# Patient Record
Sex: MALE | Race: WHITE | NOT HISPANIC OR LATINO
[De-identification: names, ages, dates, MRNs, and addresses within clinical notes are randomized per-mention and may not be internally consistent; named-entity substitution may affect disease eponyms.]

---

## 2017-04-06 ENCOUNTER — APPOINTMENT (OUTPATIENT)
Dept: DERMATOLOGY | Facility: CLINIC | Age: 62
End: 2017-04-06

## 2017-04-06 VITALS
DIASTOLIC BLOOD PRESSURE: 70 MMHG | WEIGHT: 170 LBS | SYSTOLIC BLOOD PRESSURE: 128 MMHG | BODY MASS INDEX: 29.02 KG/M2 | HEIGHT: 64 IN

## 2017-04-06 DIAGNOSIS — Z56.0 UNEMPLOYMENT, UNSPECIFIED: ICD-10-CM

## 2017-04-06 DIAGNOSIS — L29.9 PRURITUS, UNSPECIFIED: ICD-10-CM

## 2017-04-06 DIAGNOSIS — I25.2 OLD MYOCARDIAL INFARCTION: ICD-10-CM

## 2017-04-06 SDOH — ECONOMIC STABILITY - INCOME SECURITY: UNEMPLOYMENT, UNSPECIFIED: Z56.0

## 2017-11-01 ENCOUNTER — APPOINTMENT (OUTPATIENT)
Dept: CT IMAGING | Facility: IMAGING CENTER | Age: 62
End: 2017-11-01
Payer: MEDICARE

## 2017-11-01 ENCOUNTER — OUTPATIENT (OUTPATIENT)
Dept: OUTPATIENT SERVICES | Facility: HOSPITAL | Age: 62
LOS: 1 days | End: 2017-11-01
Payer: MEDICARE

## 2017-11-01 DIAGNOSIS — R91.1 SOLITARY PULMONARY NODULE: ICD-10-CM

## 2017-11-01 PROCEDURE — 71250 CT THORAX DX C-: CPT

## 2017-11-10 ENCOUNTER — OUTPATIENT (OUTPATIENT)
Dept: OUTPATIENT SERVICES | Facility: HOSPITAL | Age: 62
LOS: 1 days | Discharge: HOME | End: 2017-11-10
Payer: MEDICARE

## 2017-11-10 DIAGNOSIS — R06.09 OTHER FORMS OF DYSPNEA: ICD-10-CM

## 2017-11-10 LAB
BUN SERPL-MCNC: 24 MG/DL — HIGH (ref 7–23)
CALCIUM SERPL-MCNC: 9 MG/DL — SIGNIFICANT CHANGE UP (ref 8.4–10.5)
CHLORIDE SERPL-SCNC: 106 MMOL/L — SIGNIFICANT CHANGE UP (ref 98–107)
CO2 SERPL-SCNC: 22 MMOL/L — SIGNIFICANT CHANGE UP (ref 22–31)
CREAT SERPL-MCNC: 0.88 MG/DL — SIGNIFICANT CHANGE UP (ref 0.5–1.3)
GLUCOSE SERPL-MCNC: 139 MG/DL — HIGH (ref 70–99)
HBA1C BLD-MCNC: 5.9 % — HIGH (ref 4–5.6)
HCT VFR BLD CALC: 45.3 % — SIGNIFICANT CHANGE UP (ref 39–50)
HGB BLD-MCNC: 15.1 G/DL — SIGNIFICANT CHANGE UP (ref 13–17)
MCHC RBC-ENTMCNC: 29 PG — SIGNIFICANT CHANGE UP (ref 27–34)
MCHC RBC-ENTMCNC: 33.3 % — SIGNIFICANT CHANGE UP (ref 32–36)
MCV RBC AUTO: 87.1 FL — SIGNIFICANT CHANGE UP (ref 80–100)
NRBC # FLD: 0 — SIGNIFICANT CHANGE UP
PLATELET # BLD AUTO: 188 K/UL — SIGNIFICANT CHANGE UP (ref 150–400)
PMV BLD: 11.1 FL — SIGNIFICANT CHANGE UP (ref 7–13)
POTASSIUM SERPL-MCNC: 4.3 MMOL/L — SIGNIFICANT CHANGE UP (ref 3.5–5.3)
POTASSIUM SERPL-SCNC: 4.3 MMOL/L — SIGNIFICANT CHANGE UP (ref 3.5–5.3)
RBC # BLD: 5.2 M/UL — SIGNIFICANT CHANGE UP (ref 4.2–5.8)
RBC # FLD: 13 % — SIGNIFICANT CHANGE UP (ref 10.3–14.5)
SODIUM SERPL-SCNC: 143 MMOL/L — SIGNIFICANT CHANGE UP (ref 135–145)
WBC # BLD: 4.72 K/UL — SIGNIFICANT CHANGE UP (ref 3.8–10.5)
WBC # FLD AUTO: 4.72 K/UL — SIGNIFICANT CHANGE UP (ref 3.8–10.5)

## 2017-11-10 RX ORDER — SODIUM CHLORIDE 9 MG/ML
1000 INJECTION, SOLUTION INTRAVENOUS
Refills: 0 | Status: DISCONTINUED | OUTPATIENT
Start: 2017-11-10 | End: 2017-11-25

## 2017-11-10 RX ORDER — INSULIN LISPRO 100/ML
VIAL (ML) SUBCUTANEOUS
Refills: 0 | Status: DISCONTINUED | OUTPATIENT
Start: 2017-11-10 | End: 2017-11-25

## 2017-11-10 RX ORDER — DEXTROSE 50 % IN WATER 50 %
25 SYRINGE (ML) INTRAVENOUS ONCE
Refills: 0 | Status: DISCONTINUED | OUTPATIENT
Start: 2017-11-10 | End: 2017-11-25

## 2017-11-10 RX ORDER — SODIUM CHLORIDE 9 MG/ML
3 INJECTION INTRAMUSCULAR; INTRAVENOUS; SUBCUTANEOUS EVERY 8 HOURS
Refills: 0 | Status: DISCONTINUED | OUTPATIENT
Start: 2017-11-10 | End: 2017-11-25

## 2017-11-10 RX ORDER — DEXTROSE 50 % IN WATER 50 %
1 SYRINGE (ML) INTRAVENOUS ONCE
Refills: 0 | Status: DISCONTINUED | OUTPATIENT
Start: 2017-11-10 | End: 2017-11-25

## 2017-11-10 RX ORDER — GLUCAGON INJECTION, SOLUTION 0.5 MG/.1ML
1 INJECTION, SOLUTION SUBCUTANEOUS ONCE
Refills: 0 | Status: DISCONTINUED | OUTPATIENT
Start: 2017-11-10 | End: 2017-11-25

## 2017-11-10 RX ORDER — DEXTROSE 50 % IN WATER 50 %
12.5 SYRINGE (ML) INTRAVENOUS ONCE
Refills: 0 | Status: DISCONTINUED | OUTPATIENT
Start: 2017-11-10 | End: 2017-11-25

## 2017-11-10 RX ORDER — SODIUM CHLORIDE 9 MG/ML
500 INJECTION INTRAMUSCULAR; INTRAVENOUS; SUBCUTANEOUS
Refills: 0 | Status: DISCONTINUED | OUTPATIENT
Start: 2017-11-10 | End: 2017-11-25

## 2017-11-10 NOTE — H&P CARDIOLOGY - NEGATIVE CARDIOVASCULAR SYMPTOMS
no chest pain/no palpitations/no orthopnea/no paroxysmal nocturnal dyspnea/no peripheral edema/no claudication no palpitations/no orthopnea/no paroxysmal nocturnal dyspnea/no peripheral edema/no claudication

## 2017-11-10 NOTE — H&P CARDIOLOGY - HISTORY OF PRESENT ILLNESS
63 y/o M w/ PMH of HTN, HLD and DM presents for cardiac catheretization. Pt states that he has been experiencing intermittent chest pain and dyspnea on exertion with a recent workup of TTE and stress test. Pt stress test showed a fixed proximal infero-lateral wall perfusion defect c/w an MI with no evidence of stress induced ischemia. Pt's echo showed mild diastolic dysfunction, EF 63% and mild valvular disease. Pt denies N/V/D, fevers, chills, cough, palpitations, substernal distress, syncope, orthopnea, nocturnal paroxysmal dyspnea, edema, cyanosis, heart murmurs, varicosities, phlebitis, claudication.

## 2017-11-10 NOTE — H&P CARDIOLOGY - RS GEN PE MLT RESP DETAILS PC
airway patent/breath sounds equal/good air movement/respirations non-labored/clear to auscultation bilaterally/no chest wall tenderness

## 2017-11-10 NOTE — H&P CARDIOLOGY - NEGATIVE NEUROLOGICAL SYMPTOMS
no transient paralysis/no weakness/no paresthesias/no generalized seizures/no focal seizures/no syncope/no tremors/no vertigo/no loss of sensation/no difficulty walking/no headache/no loss of consciousness/no hemiparesis/no confusion/no facial palsy

## 2017-12-06 ENCOUNTER — APPOINTMENT (OUTPATIENT)
Dept: PULMONOLOGY | Facility: CLINIC | Age: 62
End: 2017-12-06
Payer: MEDICARE

## 2017-12-06 VITALS
HEIGHT: 64 IN | OXYGEN SATURATION: 98 % | DIASTOLIC BLOOD PRESSURE: 80 MMHG | BODY MASS INDEX: 29.02 KG/M2 | TEMPERATURE: 97.8 F | RESPIRATION RATE: 16 BRPM | HEART RATE: 56 BPM | SYSTOLIC BLOOD PRESSURE: 120 MMHG | WEIGHT: 170 LBS

## 2018-03-29 ENCOUNTER — APPOINTMENT (OUTPATIENT)
Dept: GASTROENTEROLOGY | Facility: CLINIC | Age: 63
End: 2018-03-29
Payer: MEDICARE

## 2018-03-29 VITALS
SYSTOLIC BLOOD PRESSURE: 143 MMHG | WEIGHT: 169 LBS | DIASTOLIC BLOOD PRESSURE: 93 MMHG | BODY MASS INDEX: 28.85 KG/M2 | HEART RATE: 60 BPM | HEIGHT: 64 IN

## 2018-03-29 DIAGNOSIS — E78.00 PURE HYPERCHOLESTEROLEMIA, UNSPECIFIED: ICD-10-CM

## 2018-03-29 DIAGNOSIS — E11.9 TYPE 2 DIABETES MELLITUS W/OUT COMPLICATIONS: ICD-10-CM

## 2018-03-29 DIAGNOSIS — Z80.0 FAMILY HISTORY OF MALIGNANT NEOPLASM OF DIGESTIVE ORGANS: ICD-10-CM

## 2018-03-29 DIAGNOSIS — E66.3 OVERWEIGHT: ICD-10-CM

## 2018-03-29 DIAGNOSIS — Z86.010 PERSONAL HISTORY OF COLONIC POLYPS: ICD-10-CM

## 2018-03-29 DIAGNOSIS — I10 ESSENTIAL (PRIMARY) HYPERTENSION: ICD-10-CM

## 2018-03-29 RX ORDER — LISINOPRIL 10 MG/1
10 TABLET ORAL
Refills: 0 | Status: ACTIVE | COMMUNITY

## 2018-03-29 RX ORDER — CAMPHOR AND MENTHOL 5; 5 MG/ML; MG/ML
0.5-0.5 LOTION TOPICAL
Qty: 1 | Refills: 5 | Status: DISCONTINUED | COMMUNITY
Start: 2017-04-06 | End: 2018-03-29

## 2018-03-29 RX ORDER — ATORVASTATIN CALCIUM 10 MG/1
10 TABLET, FILM COATED ORAL
Refills: 0 | Status: ACTIVE | COMMUNITY

## 2018-03-29 RX ORDER — OMEPRAZOLE 20 MG/1
20 CAPSULE, DELAYED RELEASE ORAL
Qty: 90 | Refills: 0 | Status: ACTIVE | COMMUNITY
Start: 2018-02-21

## 2018-03-29 RX ORDER — METFORMIN ER 500 MG 500 MG/1
500 TABLET ORAL
Refills: 0 | Status: ACTIVE | COMMUNITY

## 2018-09-21 ENCOUNTER — APPOINTMENT (OUTPATIENT)
Dept: GASTROENTEROLOGY | Facility: CLINIC | Age: 63
End: 2018-09-21
Payer: MEDICARE

## 2018-09-21 VITALS
SYSTOLIC BLOOD PRESSURE: 157 MMHG | HEIGHT: 64 IN | DIASTOLIC BLOOD PRESSURE: 93 MMHG | BODY MASS INDEX: 28.34 KG/M2 | HEART RATE: 58 BPM | WEIGHT: 166 LBS

## 2018-09-21 DIAGNOSIS — K62.5 HEMORRHAGE OF ANUS AND RECTUM: ICD-10-CM

## 2018-09-21 DIAGNOSIS — R14.0 ABDOMINAL DISTENSION (GASEOUS): ICD-10-CM

## 2018-09-21 DIAGNOSIS — K21.9 GASTRO-ESOPHAGEAL REFLUX DISEASE W/OUT ESOPHAGITIS: ICD-10-CM

## 2018-09-21 RX ORDER — HYDROCORTISONE 25 MG/G
2.5 CREAM TOPICAL
Qty: 28 | Refills: 0 | Status: ACTIVE | COMMUNITY
Start: 2018-09-19

## 2020-07-23 ENCOUNTER — TRANSCRIPTION ENCOUNTER (OUTPATIENT)
Age: 65
End: 2020-07-23

## 2021-12-20 ENCOUNTER — APPOINTMENT (OUTPATIENT)
Dept: SURGERY | Facility: CLINIC | Age: 66
End: 2021-12-20
Payer: MEDICARE

## 2021-12-20 VITALS
BODY MASS INDEX: 29.02 KG/M2 | SYSTOLIC BLOOD PRESSURE: 165 MMHG | HEART RATE: 65 BPM | WEIGHT: 170 LBS | HEIGHT: 64 IN | TEMPERATURE: 98.4 F | OXYGEN SATURATION: 97 % | DIASTOLIC BLOOD PRESSURE: 95 MMHG

## 2021-12-20 DIAGNOSIS — R10.9 UNSPECIFIED ABDOMINAL PAIN: ICD-10-CM

## 2021-12-20 NOTE — PHYSICAL EXAM
[de-identified] : Well appearing, no acute distress [Calm] : calm [de-identified] : Non labored [de-identified] : mildly distended, soft, non tender. No inguinal hernia appreciated on exam with Valsalva, no skin changes.

## 2021-12-20 NOTE — ASSESSMENT
[FreeTextEntry1] : 65 yo male with history as above, here for evaluation of possible inguinal hernia. No hernia on exam, and history is not consistent with hernia pain/discomfort; more likely radicular pain vs muscle strain. Patient also has ongoing migrating abdominal pain associated with gas which complicates history. Will acquire an ultrasound with Valsalva to evaluate for occult hernia. Will follow up with patient with result.

## 2021-12-20 NOTE — HISTORY OF PRESENT ILLNESS
[de-identified] : 67 yo male with history of herniated disc, DM, MI, and gas pain presenting with a few weeks of left back/low flank pain radiating to left groin. Pain is mild to moderate, occurs about every other day, and does not seem to be associated with movement or eating. Denies obstructive symptoms. Denies having seen or felt a bulge, denies skin changes. Has never had this before.

## 2021-12-31 ENCOUNTER — OUTPATIENT (OUTPATIENT)
Dept: OUTPATIENT SERVICES | Facility: HOSPITAL | Age: 66
LOS: 1 days | End: 2021-12-31
Payer: MEDICARE

## 2021-12-31 ENCOUNTER — APPOINTMENT (OUTPATIENT)
Dept: ULTRASOUND IMAGING | Facility: IMAGING CENTER | Age: 66
End: 2021-12-31
Payer: MEDICARE

## 2021-12-31 DIAGNOSIS — Z00.8 ENCOUNTER FOR OTHER GENERAL EXAMINATION: ICD-10-CM

## 2021-12-31 DIAGNOSIS — R10.9 UNSPECIFIED ABDOMINAL PAIN: ICD-10-CM

## 2021-12-31 PROCEDURE — 76857 US EXAM PELVIC LIMITED: CPT

## 2022-02-17 ENCOUNTER — INPATIENT (INPATIENT)
Facility: HOSPITAL | Age: 67
LOS: 3 days | Discharge: HOME | DRG: 287 | End: 2022-02-21
Attending: INTERNAL MEDICINE | Admitting: INTERNAL MEDICINE
Payer: MEDICARE

## 2022-02-17 VITALS
TEMPERATURE: 99 F | HEART RATE: 86 BPM | SYSTOLIC BLOOD PRESSURE: 126 MMHG | OXYGEN SATURATION: 98 % | HEIGHT: 66 IN | WEIGHT: 175.05 LBS | RESPIRATION RATE: 20 BRPM | DIASTOLIC BLOOD PRESSURE: 56 MMHG

## 2022-02-17 LAB
ALBUMIN SERPL ELPH-MCNC: 4.5 G/DL — SIGNIFICANT CHANGE UP (ref 3.3–5)
ALP SERPL-CCNC: 53 U/L — SIGNIFICANT CHANGE UP (ref 40–120)
ALT FLD-CCNC: 22 U/L — SIGNIFICANT CHANGE UP (ref 10–45)
ANION GAP SERPL CALC-SCNC: 14 MMOL/L — SIGNIFICANT CHANGE UP (ref 5–17)
APTT BLD: 31.1 SEC — SIGNIFICANT CHANGE UP (ref 27.5–35.5)
AST SERPL-CCNC: 16 U/L — SIGNIFICANT CHANGE UP (ref 10–40)
BASOPHILS # BLD AUTO: 0.02 K/UL — SIGNIFICANT CHANGE UP (ref 0–0.2)
BASOPHILS NFR BLD AUTO: 0.3 % — SIGNIFICANT CHANGE UP (ref 0–2)
BILIRUB SERPL-MCNC: 0.4 MG/DL — SIGNIFICANT CHANGE UP (ref 0.2–1.2)
BUN SERPL-MCNC: 16 MG/DL — SIGNIFICANT CHANGE UP (ref 7–23)
CALCIUM SERPL-MCNC: 9.4 MG/DL — SIGNIFICANT CHANGE UP (ref 8.4–10.5)
CHLORIDE SERPL-SCNC: 103 MMOL/L — SIGNIFICANT CHANGE UP (ref 96–108)
CO2 SERPL-SCNC: 21 MMOL/L — LOW (ref 22–31)
CREAT SERPL-MCNC: 1.02 MG/DL — SIGNIFICANT CHANGE UP (ref 0.5–1.3)
EOSINOPHIL # BLD AUTO: 0.06 K/UL — SIGNIFICANT CHANGE UP (ref 0–0.5)
EOSINOPHIL NFR BLD AUTO: 0.8 % — SIGNIFICANT CHANGE UP (ref 0–6)
GLUCOSE SERPL-MCNC: 141 MG/DL — HIGH (ref 70–99)
HCT VFR BLD CALC: 41.8 % — SIGNIFICANT CHANGE UP (ref 39–50)
HGB BLD-MCNC: 14.2 G/DL — SIGNIFICANT CHANGE UP (ref 13–17)
IMM GRANULOCYTES NFR BLD AUTO: 0.4 % — SIGNIFICANT CHANGE UP (ref 0–1.5)
INR BLD: 0.98 RATIO — SIGNIFICANT CHANGE UP (ref 0.88–1.16)
LYMPHOCYTES # BLD AUTO: 1.72 K/UL — SIGNIFICANT CHANGE UP (ref 1–3.3)
LYMPHOCYTES # BLD AUTO: 22.2 % — SIGNIFICANT CHANGE UP (ref 13–44)
MCHC RBC-ENTMCNC: 29.7 PG — SIGNIFICANT CHANGE UP (ref 27–34)
MCHC RBC-ENTMCNC: 34 GM/DL — SIGNIFICANT CHANGE UP (ref 32–36)
MCV RBC AUTO: 87.4 FL — SIGNIFICANT CHANGE UP (ref 80–100)
MONOCYTES # BLD AUTO: 0.54 K/UL — SIGNIFICANT CHANGE UP (ref 0–0.9)
MONOCYTES NFR BLD AUTO: 7 % — SIGNIFICANT CHANGE UP (ref 2–14)
NEUTROPHILS # BLD AUTO: 5.38 K/UL — SIGNIFICANT CHANGE UP (ref 1.8–7.4)
NEUTROPHILS NFR BLD AUTO: 69.3 % — SIGNIFICANT CHANGE UP (ref 43–77)
NRBC # BLD: 0 /100 WBCS — SIGNIFICANT CHANGE UP (ref 0–0)
PLATELET # BLD AUTO: 183 K/UL — SIGNIFICANT CHANGE UP (ref 150–400)
POTASSIUM SERPL-MCNC: 3.7 MMOL/L — SIGNIFICANT CHANGE UP (ref 3.5–5.3)
POTASSIUM SERPL-SCNC: 3.7 MMOL/L — SIGNIFICANT CHANGE UP (ref 3.5–5.3)
PROT SERPL-MCNC: 6.6 G/DL — SIGNIFICANT CHANGE UP (ref 6–8.3)
PROTHROM AB SERPL-ACNC: 11.8 SEC — SIGNIFICANT CHANGE UP (ref 10.6–13.6)
RBC # BLD: 4.78 M/UL — SIGNIFICANT CHANGE UP (ref 4.2–5.8)
RBC # FLD: 12.7 % — SIGNIFICANT CHANGE UP (ref 10.3–14.5)
SODIUM SERPL-SCNC: 138 MMOL/L — SIGNIFICANT CHANGE UP (ref 135–145)
TROPONIN T, HIGH SENSITIVITY RESULT: 8 NG/L — SIGNIFICANT CHANGE UP (ref 0–51)
WBC # BLD: 7.75 K/UL — SIGNIFICANT CHANGE UP (ref 3.8–10.5)
WBC # FLD AUTO: 7.75 K/UL — SIGNIFICANT CHANGE UP (ref 3.8–10.5)

## 2022-02-17 NOTE — ED PROVIDER NOTE - OBJECTIVE STATEMENT
exertional CP today with pain across chest, some dull radiation to back. 66 year old M with PMH HTN, HLD, DM presenting with exertional CP x1 day. Has had intermittent CP for past two weeks, usually resolves. Today with pain across chest, some dull radiation to back, occurred while he was taking out the trash. Also developed some SOB with pain today. Pain is non positional, non pleuritic. No associated N/V or diaphoresis. Denies syncope or new LE edema. Seen at PCP and referred to ED. Received sublingual nitroglycerin and aspirin en route, with some relief of pain. Denies fevers, chills, diarrhea, abdominal pain, URI symptoms, urinary symptoms, dark or bloody BMs.

## 2022-02-17 NOTE — ED PROVIDER NOTE - ATTENDING CONTRIBUTION TO CARE
attending Shon: 66yM former smoker h/o HTN, HLD, DMII on metformin and insulin p/w exertional chest pain x several days. Pressure like, b/l chest with radiation to upper back. Reports having a normal stress test about a year ago. Denies SOB, nausea/vomiting, syncope, diaphoresis. Received SL nitro and ASA with EMS. Exam as above. Will obtain ekg, place on tele, labs including trop, cxr, reassess

## 2022-02-17 NOTE — ED PROVIDER NOTE - CLINICAL SUMMARY MEDICAL DECISION MAKING FREE TEXT BOX
Concern for CP with exertion. No EKG changes in ED or on prior EKGS from EMS and PCP. Low suspicion for PE or dissection. Will get ACS labs, contact cardiology. CDU for cardiac workup. 66 year old M with PMH HTN, HLD, DM presenting with exertional CP x1 day. VSS, afebrile, well appearing. High risk HEART score. No EKG changes in ED or on prior EKGS from EMS and PCP. Low suspicion for PE or dissection. Will get ACS labs, contact cardiology. CDU for cardiac workup.

## 2022-02-18 DIAGNOSIS — R07.9 CHEST PAIN, UNSPECIFIED: ICD-10-CM

## 2022-02-18 LAB
CHOLEST SERPL-MCNC: 143 MG/DL — SIGNIFICANT CHANGE UP
GLUCOSE BLDC GLUCOMTR-MCNC: 112 MG/DL — HIGH (ref 70–99)
HDLC SERPL-MCNC: 64 MG/DL — SIGNIFICANT CHANGE UP
LIPID PNL WITH DIRECT LDL SERPL: 66 MG/DL — SIGNIFICANT CHANGE UP
NON HDL CHOLESTEROL: 79 MG/DL — SIGNIFICANT CHANGE UP
SARS-COV-2 RNA SPEC QL NAA+PROBE: SIGNIFICANT CHANGE UP
TRIGL SERPL-MCNC: 66 MG/DL — SIGNIFICANT CHANGE UP
TROPONIN T, HIGH SENSITIVITY RESULT: 8 NG/L — SIGNIFICANT CHANGE UP (ref 0–51)

## 2022-02-18 RX ORDER — DEXTROSE 50 % IN WATER 50 %
15 SYRINGE (ML) INTRAVENOUS ONCE
Refills: 0 | Status: DISCONTINUED | OUTPATIENT
Start: 2022-02-18 | End: 2022-02-21

## 2022-02-18 RX ORDER — GABAPENTIN 400 MG/1
300 CAPSULE ORAL
Refills: 0 | Status: DISCONTINUED | OUTPATIENT
Start: 2022-02-18 | End: 2022-02-21

## 2022-02-18 RX ORDER — DEXTROSE 50 % IN WATER 50 %
12.5 SYRINGE (ML) INTRAVENOUS ONCE
Refills: 0 | Status: DISCONTINUED | OUTPATIENT
Start: 2022-02-18 | End: 2022-02-21

## 2022-02-18 RX ORDER — INSULIN LISPRO 100/ML
VIAL (ML) SUBCUTANEOUS
Refills: 0 | Status: DISCONTINUED | OUTPATIENT
Start: 2022-02-18 | End: 2022-02-21

## 2022-02-18 RX ORDER — HEPARIN SODIUM 5000 [USP'U]/ML
5000 INJECTION INTRAVENOUS; SUBCUTANEOUS EVERY 8 HOURS
Refills: 0 | Status: DISCONTINUED | OUTPATIENT
Start: 2022-02-18 | End: 2022-02-21

## 2022-02-18 RX ORDER — ASPIRIN/CALCIUM CARB/MAGNESIUM 324 MG
81 TABLET ORAL DAILY
Refills: 0 | Status: DISCONTINUED | OUTPATIENT
Start: 2022-02-18 | End: 2022-02-21

## 2022-02-18 RX ORDER — INSULIN LISPRO 100/ML
VIAL (ML) SUBCUTANEOUS AT BEDTIME
Refills: 0 | Status: DISCONTINUED | OUTPATIENT
Start: 2022-02-18 | End: 2022-02-21

## 2022-02-18 RX ORDER — AMLODIPINE BESYLATE 2.5 MG/1
10 TABLET ORAL DAILY
Refills: 0 | Status: DISCONTINUED | OUTPATIENT
Start: 2022-02-18 | End: 2022-02-21

## 2022-02-18 RX ORDER — GLUCAGON INJECTION, SOLUTION 0.5 MG/.1ML
1 INJECTION, SOLUTION SUBCUTANEOUS ONCE
Refills: 0 | Status: DISCONTINUED | OUTPATIENT
Start: 2022-02-18 | End: 2022-02-21

## 2022-02-18 RX ORDER — FAMOTIDINE 10 MG/ML
20 INJECTION INTRAVENOUS ONCE
Refills: 0 | Status: COMPLETED | OUTPATIENT
Start: 2022-02-18 | End: 2022-02-18

## 2022-02-18 RX ORDER — KETOROLAC TROMETHAMINE 30 MG/ML
15 SYRINGE (ML) INJECTION ONCE
Refills: 0 | Status: DISCONTINUED | OUTPATIENT
Start: 2022-02-18 | End: 2022-02-18

## 2022-02-18 RX ORDER — DEXTROSE 50 % IN WATER 50 %
25 SYRINGE (ML) INTRAVENOUS ONCE
Refills: 0 | Status: DISCONTINUED | OUTPATIENT
Start: 2022-02-18 | End: 2022-02-21

## 2022-02-18 RX ORDER — FAMOTIDINE 10 MG/ML
20 INJECTION INTRAVENOUS DAILY
Refills: 0 | Status: DISCONTINUED | OUTPATIENT
Start: 2022-02-18 | End: 2022-02-18

## 2022-02-18 RX ORDER — SODIUM CHLORIDE 9 MG/ML
1000 INJECTION, SOLUTION INTRAVENOUS
Refills: 0 | Status: DISCONTINUED | OUTPATIENT
Start: 2022-02-18 | End: 2022-02-21

## 2022-02-18 RX ORDER — LIDOCAINE 4 G/100G
1 CREAM TOPICAL ONCE
Refills: 0 | Status: COMPLETED | OUTPATIENT
Start: 2022-02-18 | End: 2022-02-18

## 2022-02-18 RX ORDER — ATORVASTATIN CALCIUM 80 MG/1
40 TABLET, FILM COATED ORAL AT BEDTIME
Refills: 0 | Status: DISCONTINUED | OUTPATIENT
Start: 2022-02-18 | End: 2022-02-21

## 2022-02-18 RX ADMIN — LIDOCAINE 1 PATCH: 4 CREAM TOPICAL at 06:55

## 2022-02-18 RX ADMIN — AMLODIPINE BESYLATE 10 MILLIGRAM(S): 2.5 TABLET ORAL at 06:51

## 2022-02-18 RX ADMIN — LIDOCAINE 1 PATCH: 4 CREAM TOPICAL at 08:00

## 2022-02-18 RX ADMIN — LIDOCAINE 1 PATCH: 4 CREAM TOPICAL at 17:27

## 2022-02-18 RX ADMIN — ATORVASTATIN CALCIUM 40 MILLIGRAM(S): 80 TABLET, FILM COATED ORAL at 22:15

## 2022-02-18 RX ADMIN — FAMOTIDINE 20 MILLIGRAM(S): 10 INJECTION INTRAVENOUS at 06:52

## 2022-02-18 RX ADMIN — Medication 15 MILLIGRAM(S): at 07:25

## 2022-02-18 RX ADMIN — GABAPENTIN 300 MILLIGRAM(S): 400 CAPSULE ORAL at 06:51

## 2022-02-18 RX ADMIN — GABAPENTIN 300 MILLIGRAM(S): 400 CAPSULE ORAL at 18:01

## 2022-02-18 RX ADMIN — Medication 15 MILLIGRAM(S): at 06:52

## 2022-02-18 RX ADMIN — Medication 81 MILLIGRAM(S): at 23:50

## 2022-02-18 RX ADMIN — HEPARIN SODIUM 5000 UNIT(S): 5000 INJECTION INTRAVENOUS; SUBCUTANEOUS at 22:16

## 2022-02-18 NOTE — CONSULT NOTE ADULT - SUBJECTIVE AND OBJECTIVE BOX
DATE OF SERVICE: 02-18-22 @ 21:53    CHIEF COMPLAINT:Patient is a 66y old  Male who presents with a chief complaint of     HISTORY OF PRESENT ILLNESS:HPI:  patient is a 66 year old Male with PMH HTN, HLD, DM presenting with exertional CP x1 day. Has had intermittent CP for past two weeks, usually resolves. Today with pain across chest, some dull radiation to back, occurred while he was taking out the trash. Also developed some SOB with pain today. Pain is non positional, non pleuritic. No associated N/V or diaphoresis. Denies syncope or new LE edema. Seen at PCP and referred to ED. Received sublingual nitroglycerin and aspirin en route, with some relief of pain. Denies fevers, chills, diarrhea, abdominal pain, URI symptoms, urinary symptoms, dark or bloody BMs. (18 Feb 2022 20:40)      PAST MEDICAL & SURGICAL HISTORY:  HTN (hypertension)    HLD (hyperlipidemia)    DM (diabetes mellitus)    No significant past surgical history            MEDICATIONS:  amLODIPine   Tablet 10 milliGRAM(s) Oral daily  aspirin  chewable 81 milliGRAM(s) Oral daily  heparin   Injectable 5000 Unit(s) SubCutaneous every 8 hours        gabapentin 300 milliGRAM(s) Oral two times a day      atorvastatin 40 milliGRAM(s) Oral at bedtime  dextrose 40% Gel 15 Gram(s) Oral once  dextrose 50% Injectable 25 Gram(s) IV Push once  dextrose 50% Injectable 12.5 Gram(s) IV Push once  dextrose 50% Injectable 25 Gram(s) IV Push once  glucagon  Injectable 1 milliGRAM(s) IntraMuscular once  insulin lispro (ADMELOG) corrective regimen sliding scale   SubCutaneous three times a day before meals  insulin lispro (ADMELOG) corrective regimen sliding scale   SubCutaneous at bedtime    dextrose 5%. 1000 milliLiter(s) IV Continuous <Continuous>  dextrose 5%. 1000 milliLiter(s) IV Continuous <Continuous>      FAMILY HISTORY:  No pertinent family history in first degree relatives        Non-contributory    SOCIAL HISTORY:    [ ] Tobacco  [ ] Drugs  [ ] Alcohol    Allergies    influenza virus vaccine, inactivated (Hives; Urticaria)    Intolerances    	    REVIEW OF SYSTEMS:  CONSTITUTIONAL: No fever  EYES: No eye pain, visual disturbances, or discharge  ENMT:  No difficulty hearing, tinnitus  NECK: No pain or stiffness  RESPIRATORY: No cough, wheezing,  CARDIOVASCULAR: No chest pain, palpitations, passing out, dizziness, or leg swelling  GASTROINTESTINAL:  No nausea, vomiting, diarrhea or constipation. No melena.  GENITOURINARY: No dysuria, hematuria  NEUROLOGICAL: No stroke like symptoms  SKIN: No burning or lesions   ENDOCRINE: No heat or cold intolerance  MUSCULOSKELETAL: No joint pain or swelling  PSYCHIATRIC: No  anxiety, mood swings  HEME/LYMPH: No bleeding gums  ALLERGY AND IMMUNOLOGIC: No hives or eczema	    All other ROS negative    PHYSICAL EXAM:  T(C): 36.7 (02-18-22 @ 19:34), Max: 37.2 (02-18-22 @ 02:45)  HR: 51 (02-18-22 @ 19:34) (47 - 77)  BP: 132/78 (02-18-22 @ 19:34) (125/71 - 161/76)  RR: 16 (02-18-22 @ 19:34) (16 - 18)  SpO2: 96% (02-18-22 @ 19:34) (96% - 100%)  Wt(kg): --  I&O's Summary      Appearance: Normal	  HEENT:   Normal oral mucosa, EOMI	  Cardiovascular:  S1 S2, No JVD,    Respiratory: Lungs clear to auscultation	  Psychiatry: Alert  Gastrointestinal:  Soft, Non-tender, + BS	  Skin: No rashes   Neurologic: Non-focal  Extremities:  No edema  Vascular: Peripheral pulses palpable    	    	  	  CARDIAC MARKERS:  Labs personally reviewed by me                                  14.2   7.75  )-----------( 183      ( 17 Feb 2022 21:46 )             41.8     02-17    138  |  103  |  16  ----------------------------<  141<H>  3.7   |  21<L>  |  1.02    Ca    9.4      17 Feb 2022 21:46    TPro  6.6  /  Alb  4.5  /  TBili  0.4  /  DBili  x   /  AST  16  /  ALT  22  /  AlkPhos  53  02-17          EKG: Personally reviewed by me -   Radiology: Personally reviewed by me -       Assessment /Plan:   Cath Sunday  Full recs to follow        Differential diagnosis and plan of care discussed with patient after the evaluation. Counseling on diet, nutritional counseling, weight management, exercise and medication compliance was done.   Advanced care planning/advanced directives discussed with patient/family. DNR status including forceful chest compressions to attempt to restart the heart, ventilator support/artificial breathing, electric shock, artificial nutrition, health care proxy, Molst form all discussed with pt. Pt wishes to consider. More than fifteen minutes spent on discussing advanced directives. OMT on six regions for acute somatic dysfunctions done at the bedside. One hundred ten minutes spent on encounter, of which more than fifty percent of the encounter was spent counseling and/or coordinating care by the attending physician        Unruly Santos DO [facility]  Cardiovascular Medicine  [address] [place] [address], [address]  Office [phone]  Cell [phone] DATE OF SERVICE: 02-18-22 @ 21:53    CHIEF COMPLAINT:Patient is a 66y old  Male who presents with a chief complaint of     HISTORY OF PRESENT ILLNESS:HPI:  patient is a 66 year old Male with PMH HTN, HLD, DM presenting with exertional CP x1 day. Has had intermittent CP for past two weeks, usually resolves. Today with pain across chest, some dull radiation to back, occurred while he was taking out the trash. Also developed some SOB with pain today. Pain is non positional, non pleuritic. No associated N/V or diaphoresis. Denies syncope or new LE edema. Seen at PCP and referred to ED. Received sublingual nitroglycerin and aspirin en route, with some relief of pain. Denies fevers, chills, diarrhea, abdominal pain, URI symptoms, urinary symptoms, dark or bloody BMs. (18 Feb 2022 20:40)      PAST MEDICAL & SURGICAL HISTORY:  HTN (hypertension)    HLD (hyperlipidemia)    DM (diabetes mellitus)    No significant past surgical history            MEDICATIONS:  amLODIPine   Tablet 10 milliGRAM(s) Oral daily  aspirin  chewable 81 milliGRAM(s) Oral daily  heparin   Injectable 5000 Unit(s) SubCutaneous every 8 hours        gabapentin 300 milliGRAM(s) Oral two times a day      atorvastatin 40 milliGRAM(s) Oral at bedtime  dextrose 40% Gel 15 Gram(s) Oral once  dextrose 50% Injectable 25 Gram(s) IV Push once  dextrose 50% Injectable 12.5 Gram(s) IV Push once  dextrose 50% Injectable 25 Gram(s) IV Push once  glucagon  Injectable 1 milliGRAM(s) IntraMuscular once  insulin lispro (ADMELOG) corrective regimen sliding scale   SubCutaneous three times a day before meals  insulin lispro (ADMELOG) corrective regimen sliding scale   SubCutaneous at bedtime    dextrose 5%. 1000 milliLiter(s) IV Continuous <Continuous>  dextrose 5%. 1000 milliLiter(s) IV Continuous <Continuous>      FAMILY HISTORY:  No pertinent family history in first degree relatives        Non-contributory    SOCIAL HISTORY:    [ ] not a smoker    Allergies    influenza virus vaccine, inactivated (Hives; Urticaria)    Intolerances    	    REVIEW OF SYSTEMS:  CONSTITUTIONAL: No fever  EYES: No eye pain, visual disturbances, or discharge  ENMT:  No difficulty hearing, tinnitus  NECK: No pain or stiffness  RESPIRATORY: No cough, wheezing,  CARDIOVASCULAR: + chest pain, no palpitations, passing out, dizziness, or leg swelling  GASTROINTESTINAL:  No nausea, vomiting, diarrhea or constipation. No melena.  GENITOURINARY: No dysuria, hematuria  NEUROLOGICAL: No stroke like symptoms  SKIN: No burning or lesions   ENDOCRINE: No heat or cold intolerance  MUSCULOSKELETAL: No joint pain or swelling  PSYCHIATRIC: No  anxiety, mood swings  HEME/LYMPH: No bleeding gums  ALLERGY AND IMMUNOLOGIC: No hives or eczema	    All other ROS negative    PHYSICAL EXAM:  T(C): 36.7 (02-18-22 @ 19:34), Max: 37.2 (02-18-22 @ 02:45)  HR: 51 (02-18-22 @ 19:34) (47 - 77)  BP: 132/78 (02-18-22 @ 19:34) (125/71 - 161/76)  RR: 16 (02-18-22 @ 19:34) (16 - 18)  SpO2: 96% (02-18-22 @ 19:34) (96% - 100%)  Wt(kg): --  I&O's Summary      Appearance: Normal	  HEENT:   Normal oral mucosa, EOMI	  Cardiovascular:  S1 S2, No JVD,    Respiratory: Lungs clear to auscultation	  Psychiatry: Alert  Gastrointestinal:  Soft, Non-tender, + BS	  Skin: No rashes   Neurologic: Non-focal  Extremities:  No edema  Vascular: Peripheral pulses palpable    	    	  	  CARDIAC MARKERS:  Labs personally reviewed by me                                  14.2   7.75  )-----------( 183      ( 17 Feb 2022 21:46 )             41.8     02-17    138  |  103  |  16  ----------------------------<  141<H>  3.7   |  21<L>  |  1.02    Ca    9.4      17 Feb 2022 21:46    TPro  6.6  /  Alb  4.5  /  TBili  0.4  /  DBili  x   /  AST  16  /  ALT  22  /  AlkPhos  53  02-17          EKG: Personally reviewed by me - NSR  Radiology: Personally reviewed by me - CXR clear lungs      Assessment and Plan:   · Assessment	  patient is a 66 year old Male with PMH HTN, HLD, DM presenting with exertional CP x1 day. Has had intermittent CP for past two weeks, usually resolves. Today with pain across chest, some dull radiation to back, occurred while he was taking out the trash. Also developed some SOB with pain today. Pain is non positional, non pleuritic. No associated N/V or diaphoresis. Denies syncope or new LE edema. Seen at PCP and referred to ED. Received sublingual nitroglycerin and aspirin en route, with some relief of pain. Denies fevers, chills, diarrhea, abdominal pain, URI symptoms, urinary symptoms, dark or bloody BMs.      1. Chest pain   --Echo-- order is in, pending  --Stress test positive  --Plan for cardiac cath on Sunday  --C/w ASA and statin  --Monitor hemodynamics    2. HLD  --STATIN  --Check lipid panel -- unremarkable   --Dash diet     3. DM  --C/w sliding scale  --Hold oral meds  --Check A1C --> 6.0--> outpatient follow up   --Avoid hypo/hyperglycemia     4. DVT and GI PPX             Differential diagnosis and plan of care discussed with patient after the evaluation. Counseling on diet, nutritional counseling, weight management, exercise and medication compliance was done.   Advanced care planning/advanced directives discussed with patient/family. DNR status including forceful chest compressions to attempt to restart the heart, ventilator support/artificial breathing, electric shock, artificial nutrition, health care proxy, Molst form all discussed with pt. Pt wishes to consider. More than fifteen minutes spent on discussing advanced directives. OMT on six regions for acute somatic dysfunctions done at the bedside. One hundred ten minutes spent on encounter, of which more than fifty percent of the encounter was spent counseling and/or coordinating care by the attending physician        Unruly Santos DO [facility]  Cardiovascular Medicine  [address] [place] [address], [address]  Office [phone]  Cell [phone]

## 2022-02-18 NOTE — ED CDU PROVIDER INITIAL DAY NOTE - ATTENDING CONTRIBUTION TO CARE
attending Shon: pt with exertional chest pain. Plan for CDU for tele monitoring, stress test in AM, cardiology following, frequent reevaluations

## 2022-02-18 NOTE — ED CDU PROVIDER INITIAL DAY NOTE - PHYSICAL EXAMINATION
GEN: Well Appearing, Nontoxic, NAD  HEENT: NC/AT, Symm Facies. Eyes clear.  CV: No JVD/Bruits or stridor;  +S1S2, RRR w/o m/g/r  RESP: CTAB w/o w/r/r  ABD: Soft, +RUQ mildly ttp/nd, +BS. No guarding/rebound.   EXT/MSK: No lower extremity edema or calf tenderness +equal palpable radial pulses. FROMx4  SKIN: No visible erythema, lesions or rash  Neuro: Grossly intact, AOX3 with normal speech,  Gait normal  Psych: Appropriate mood and affect GEN: Well Appearing, Nontoxic, NAD  HEENT: NC/AT, Symm Facies. Eyes clear.  CV: No JVD/Bruits or stridor;  +S1S2, RRR w/o m/g/r  RESP: CTAB w/o w/r/r  ABD: Soft, +RUQ mildly ttp/nd, +BS. No guarding/rebound.   EXT/MSK: No lower extremity edema or calf tenderness +equal palpable radial pulses. FROMx4. No midline C/T/L spine tenderness  SKIN: No visible erythema, lesions or rash  Neuro: Grossly intact, AOX3 with normal speech,  Gait normal  Psych: Appropriate mood and affect

## 2022-02-18 NOTE — ED CDU PROVIDER SUBSEQUENT DAY NOTE - NS ED ATTENDING STATEMENT MOD
This was a shared visit with the WALDEMAR. I reviewed and verified the documentation and independently performed the documented:

## 2022-02-18 NOTE — ED CDU PROVIDER SUBSEQUENT DAY NOTE - PROGRESS NOTE DETAILS
Pt now in CDU. NAD, no acute complaints. Resting comfortably, HR sinus kassie in the 50s. - Airam Chun PA-C Pt comfortable. c/o intermittent chest pain. Vital signs stable. Will continue to observe and reassess. Awaiting stress test. -Nohemi Jiang PA-C Pt comfortable. No complaints. Vital signs stable. stress test abnormal. spoke to cardiology Dr. Santos who recommends admission for cardiac cath. discussed with CDU attending Dr. Bhakta, will admit pt. -Nohemi Jiang PA-C

## 2022-02-18 NOTE — ED CDU PROVIDER DISPOSITION NOTE - NSFOLLOWUPINSTRUCTIONS_ED_ALL_ED_FT
Hydrate.     You may be contacted by our Emergency Department Referrals Coordinator to set up your follow up appointment within 24-48 hours of your discharge Monday- Friday. We recommend you follow up with your primary care provider within the next 2-3 days, please bring all of your results with you.     You can also follow up with your cardiologist, call the office today to make an appointment.    Please return to the Emergency Department with new, worsening, or concerning symptoms, such as:  -Shortness of breath or trouble breathing  -Pressure, pain, tightness in chest  -Facial drooping, arm weakness, or speech difficulty   -Head injury or loss of consciousness   -Nonstop bleeding or an open wound     *More detailed information regarding your visit and discharge can be found by reviewing this packet

## 2022-02-18 NOTE — H&P ADULT - ASSESSMENT
patient is a 66 year old Male with PMH HTN, HLD, DM presenting with exertional CP x1 day. Has had intermittent CP for past two weeks, usually resolves. Today with pain across chest, some dull radiation to back, occurred while he was taking out the trash. Also developed some SOB with pain today. Pain is non positional, non pleuritic. No associated N/V or diaphoresis. Denies syncope or new LE edema. Seen at PCP and referred to ED. Received sublingual nitroglycerin and aspirin en route, with some relief of pain. Denies fevers, chills, diarrhea, abdominal pain, URI symptoms, urinary symptoms, dark or bloody BMs.      # Chest pain   R/O ACS  Serial CE and KEG  Echo  stress test positive  plan for cath on sunday  card madisyn called  ASA and statin  monitor hemodnamics    # HLD  STATIN  dash diet     # DM  slidng scale  hold oral meds  A1C         DVT and Gi PPX

## 2022-02-18 NOTE — ED CDU PROVIDER INITIAL DAY NOTE - OBJECTIVE STATEMENT
66 year old M with PMH HTN, HLD, DM, chronic back pain presenting with exertional CP x1 day. Has had intermittent CP for past two weeks, usually resolves. Today with pain across chest, some dull radiation to back, occurred while he was taking out the trash. States that pain was stronger than before. Also developed some SOB with pain today. No associated N/V or diaphoresis. Seen at PCP and referred to ED. Of note, patient states that he has had left lower abdominal pain for 1.5 months, for which he had an ultrasound for that was negative for hernia, with scheduled outpatient follow up with PMD. Also endorses diarrhea a few days ago. Denies fevers, chills, vomiting, urinary symptoms. Unsure of cardiologist's name, although had a stress test 1.5 years ago. Brother had heart attack at age 64 and passed away. [place]  [id number].   ED course: Trop 8-8. RUQ ultrasound negative for acute pathology. Dr. Santos consulted. Plan for tele monitoring and stress test in CDU.

## 2022-02-18 NOTE — PATIENT PROFILE ADULT - FALL HARM RISK - UNIVERSAL INTERVENTIONS
Bed in lowest position, wheels locked, appropriate side rails in place/Call bell, personal items and telephone in reach/Instruct patient to call for assistance before getting out of bed or chair/Non-slip footwear when patient is out of bed/[place] to call system/Physically safe environment - no spills, clutter or unnecessary equipment/Purposeful Proactive Rounding/Room/bathroom lighting operational, light cord in reach

## 2022-02-18 NOTE — H&P ADULT - HISTORY OF PRESENT ILLNESS
patient is a 66 year old Male with PMH HTN, HLD, DM presenting with exertional CP x1 day. Has had intermittent CP for past two weeks, usually resolves. Today with pain across chest, some dull radiation to back, occurred while he was taking out the trash. Also developed some SOB with pain today. Pain is non positional, non pleuritic. No associated N/V or diaphoresis. Denies syncope or new LE edema. Seen at PCP and referred to ED. Received sublingual nitroglycerin and aspirin en route, with some relief of pain. Denies fevers, chills, diarrhea, abdominal pain, URI symptoms, urinary symptoms, dark or bloody BMs.

## 2022-02-18 NOTE — H&P ADULT - NSHPPHYSICALEXAM_GEN_ALL_CORE
Vital Signs Last 24 Hrs  T(C): 36.7 (18 Feb 2022 19:34), Max: 37.2 (17 Feb 2022 21:31)  T(F): 98 (18 Feb 2022 19:34), Max: 99 (17 Feb 2022 21:31)  HR: 51 (18 Feb 2022 19:34) (47 - 86)  BP: 132/78 (18 Feb 2022 19:34) (125/71 - 161/76)  BP(mean): --  RR: 16 (18 Feb 2022 19:34) (16 - 20)  SpO2: 96% (18 Feb 2022 19:34) (96% - 100%)    Appearance: Normal	  HEENT:   Normal oral mucosa, PERRL, EOMI	  Lymphatic: No lymphadenopathy , no edema  Cardiovascular: Normal S1 S2, No JVD, No murmurs , Peripheral pulses palpable 2+ bilaterally  Respiratory: Lungs clear to auscultation, normal effort 	  Gastrointestinal:  Soft, Non-tender, + BS	  Skin: No rashes, No ecchymoses, No cyanosis, warm to touch  Musculoskeletal: Normal range of motion, normal strength  Psychiatry:  Mood & affect appropriate  Ext: No edema

## 2022-02-18 NOTE — ED CDU PROVIDER SUBSEQUENT DAY NOTE - PHYSICAL EXAMINATION
GEN: Well Appearing, Nontoxic, NAD  HEENT: NC/AT, Symm Facies. Eyes clear.  CV: No JVD/Bruits or stridor;  +S1S2, RRR w/o m/g/r  RESP: CTAB w/o w/r/r  ABD: Soft, +RUQ mildly ttp/nd, +BS. No guarding/rebound.   EXT/MSK: No lower extremity edema or calf tenderness +equal palpable radial pulses. FROMx4  SKIN: No visible erythema, lesions or rash  Neuro: Grossly intact, AOX3 with normal speech,  Gait normal  Psych: Appropriate mood and affect

## 2022-02-18 NOTE — ED CDU PROVIDER DISPOSITION NOTE - ATTENDING CONTRIBUTION TO CARE
I, Anthony Bhakta, performed a history and physical exam of the patient and discussed their management with the resident and /or advanced care provider. I reviewed the resident and /or ACP's note and agree with the documented findings and plan of care. I was present and available for all procedures.    initial RUQ and now with intermittent chest pain w. normal vital sign. non actionable blood work and was send to CDU for stress test and tele monitoring. Subsequently, found to have abnormal stress test and will admit to Dr. Santos for cardiac cath.

## 2022-02-18 NOTE — ED CDU PROVIDER SUBSEQUENT DAY NOTE - HISTORY
Patient still in ED. Upon my evaluation patient was ttp RUQ. I spoke with Dr. Fairchild and discussed case. With non-actionable RUQ US plan for CDU for stress test and cardiology consult. - Airam Chun PA-C

## 2022-02-18 NOTE — ED CDU PROVIDER INITIAL DAY NOTE - NS ED ROS FT
Constitutional: No fever or chills  Eyes: No visual changes, eye pain   CV: +chest pain No lower extremity edema  Resp: No SOB no cough  GI: + abd pain. No nausea or vomiting. + diarrhea.   : No dysuria, hematuria.   MSK: No musculoskeletal pain  Skin: No rash  Psych: No complaints   Neuro: No headache. No numbness or tingling. No weakness.  Endo: +DM

## 2022-02-18 NOTE — PATIENT PROFILE ADULT - CENTRAL VENOUS CATHETER/PICC LINE
GOAL: Pt will demonstrate improved static/dynamic standing balance to good, in order to improve stability, decrease fall risk and increase independence with ADLs within 4 weeks.
no

## 2022-02-18 NOTE — ED CDU PROVIDER SUBSEQUENT DAY NOTE - ATTENDING CONTRIBUTION TO CARE
Attending MD Askew:   I personally have seen and examined this patient.  Physician assistant note reviewed and agree on plan of care and except where noted.  See below for details.     Seen in CDU [facility]    66M with PMH/PSH including DM, HTN, HLD sent to the CDU after presenting to the ED with exertional intermittent chest pain for two weeks.  Reports still having intermittent chest pain while in the hospital but denies at present.  Denies shortness of breath, palpitations, LE edema.  Denies abdominal pain, nausea, vomiting, diarrhea, urinary complaints.  Denies fevers, chills, dizziness, weakness.  A ten (10) point review of systems was negative other than as stated in the HPI or elsewhere in the chart.    Exam:   General: NAD  HENT: head NCAT, airway patent  Eyes: PERRL  Lungs: lungs CTAB with good inspiratory effort, no wheezing, no rhonchi, no rales  Cardiac: +S1S2, no m/r/g  GI: abdomen soft with +BS, NT, ND  : no CVAT  MSK: no calf tenderness, swelling, erythema or warmth  Neuro: moving all extremities spontaneously, sensory grossly intact, no gross neuro deficits  Psych: normal mood and affect     A/P: 66M with intermittent chest pain, labs and CXR reviewed, pending stress.  Will continue tele monitoring and frequent reassessment, will await stress.

## 2022-02-18 NOTE — ED CDU PROVIDER DISPOSITION NOTE - CLINICAL COURSE
66 year old M with PMH HTN, HLD, DM, chronic back pain presenting with exertional CP x1 day. Has had intermittent CP for past two weeks, usually resolves. Today with pain across chest, some dull radiation to back, occurred while he was taking out the trash. States that pain was stronger than before. Also developed some SOB with pain today. No associated N/V or diaphoresis. Seen at PCP and referred to ED. Of note, patient states that he has had left lower abdominal pain for 1.5 months, for which he had an ultrasound for that was negative for hernia, with scheduled outpatient follow up with PMD. Also endorses diarrhea a few days ago. Denies fevers, chills, vomiting, urinary symptoms. Unsure of cardiologist's name, although had a stress test 1.5 years ago. Brother had heart attack at age 64 and passed away. [place]  [id number].   ED course: Trop 8-8. RUQ ultrasound negative for acute pathology. Dr. Santos consulted. Plan for tele monitoring and stress test in CDU. 66 year old M with PMH HTN, HLD, DM, chronic back pain presenting with exertional CP x1 day. Has had intermittent CP for past two weeks, usually resolves. Today with pain across chest, some dull radiation to back, occurred while he was taking out the trash. States that pain was stronger than before. Also developed some SOB with pain today. No associated N/V or diaphoresis. Seen at PCP and referred to ED. Of note, patient states that he has had left lower abdominal pain for 1.5 months, for which he had an ultrasound for that was negative for hernia, with scheduled outpatient follow up with PMD. Also endorses diarrhea a few days ago. Denies fevers, chills, vomiting, urinary symptoms. Unsure of cardiologist's name, although had a stress test 1.5 years ago. Brother had heart attack at age 64 and passed away. [place]  [id number].   ED course: Trop 8-8. RUQ ultrasound negative for acute pathology. Dr. Santos consulted. Plan for tele monitoring and stress test in CDU.  stress test positive. pt was admitted for further management.

## 2022-02-19 LAB
A1C WITH ESTIMATED AVERAGE GLUCOSE RESULT: 6 % — HIGH (ref 4–5.6)
ALBUMIN SERPL ELPH-MCNC: 4 G/DL — SIGNIFICANT CHANGE UP (ref 3.3–5)
ALP SERPL-CCNC: 44 U/L — SIGNIFICANT CHANGE UP (ref 40–120)
ALT FLD-CCNC: 16 U/L — SIGNIFICANT CHANGE UP (ref 10–45)
ANION GAP SERPL CALC-SCNC: 11 MMOL/L — SIGNIFICANT CHANGE UP (ref 5–17)
AST SERPL-CCNC: 13 U/L — SIGNIFICANT CHANGE UP (ref 10–40)
BILIRUB SERPL-MCNC: 0.7 MG/DL — SIGNIFICANT CHANGE UP (ref 0.2–1.2)
BUN SERPL-MCNC: 18 MG/DL — SIGNIFICANT CHANGE UP (ref 7–23)
CALCIUM SERPL-MCNC: 9.5 MG/DL — SIGNIFICANT CHANGE UP (ref 8.4–10.5)
CHLORIDE SERPL-SCNC: 105 MMOL/L — SIGNIFICANT CHANGE UP (ref 96–108)
CHOLEST SERPL-MCNC: 151 MG/DL — SIGNIFICANT CHANGE UP
CO2 SERPL-SCNC: 22 MMOL/L — SIGNIFICANT CHANGE UP (ref 22–31)
CREAT SERPL-MCNC: 0.84 MG/DL — SIGNIFICANT CHANGE UP (ref 0.5–1.3)
ESTIMATED AVERAGE GLUCOSE: 126 MG/DL — HIGH (ref 68–114)
GLUCOSE BLDC GLUCOMTR-MCNC: 107 MG/DL — HIGH (ref 70–99)
GLUCOSE BLDC GLUCOMTR-MCNC: 110 MG/DL — HIGH (ref 70–99)
GLUCOSE BLDC GLUCOMTR-MCNC: 110 MG/DL — HIGH (ref 70–99)
GLUCOSE BLDC GLUCOMTR-MCNC: 115 MG/DL — HIGH (ref 70–99)
GLUCOSE SERPL-MCNC: 113 MG/DL — HIGH (ref 70–99)
HCT VFR BLD CALC: 43.5 % — SIGNIFICANT CHANGE UP (ref 39–50)
HCV AB S/CO SERPL IA: 0.23 S/CO — SIGNIFICANT CHANGE UP (ref 0–0.99)
HCV AB SERPL-IMP: SIGNIFICANT CHANGE UP
HDLC SERPL-MCNC: 65 MG/DL — SIGNIFICANT CHANGE UP
HGB BLD-MCNC: 14.7 G/DL — SIGNIFICANT CHANGE UP (ref 13–17)
LIPID PNL WITH DIRECT LDL SERPL: 69 MG/DL — SIGNIFICANT CHANGE UP
MCHC RBC-ENTMCNC: 29.3 PG — SIGNIFICANT CHANGE UP (ref 27–34)
MCHC RBC-ENTMCNC: 33.8 GM/DL — SIGNIFICANT CHANGE UP (ref 32–36)
MCV RBC AUTO: 86.8 FL — SIGNIFICANT CHANGE UP (ref 80–100)
NON HDL CHOLESTEROL: 86 MG/DL — SIGNIFICANT CHANGE UP
NRBC # BLD: 0 /100 WBCS — SIGNIFICANT CHANGE UP (ref 0–0)
PLATELET # BLD AUTO: 178 K/UL — SIGNIFICANT CHANGE UP (ref 150–400)
POTASSIUM SERPL-MCNC: 3.9 MMOL/L — SIGNIFICANT CHANGE UP (ref 3.5–5.3)
POTASSIUM SERPL-SCNC: 3.9 MMOL/L — SIGNIFICANT CHANGE UP (ref 3.5–5.3)
PROT SERPL-MCNC: 6.2 G/DL — SIGNIFICANT CHANGE UP (ref 6–8.3)
RBC # BLD: 5.01 M/UL — SIGNIFICANT CHANGE UP (ref 4.2–5.8)
RBC # FLD: 12.8 % — SIGNIFICANT CHANGE UP (ref 10.3–14.5)
SODIUM SERPL-SCNC: 138 MMOL/L — SIGNIFICANT CHANGE UP (ref 135–145)
TRIGL SERPL-MCNC: 86 MG/DL — SIGNIFICANT CHANGE UP
TSH SERPL-MCNC: 1.82 UIU/ML — SIGNIFICANT CHANGE UP (ref 0.27–4.2)
WBC # BLD: 5.05 K/UL — SIGNIFICANT CHANGE UP (ref 3.8–10.5)
WBC # FLD AUTO: 5.05 K/UL — SIGNIFICANT CHANGE UP (ref 3.8–10.5)

## 2022-02-19 RX ADMIN — GABAPENTIN 300 MILLIGRAM(S): 400 CAPSULE ORAL at 05:40

## 2022-02-19 RX ADMIN — Medication 81 MILLIGRAM(S): at 12:33

## 2022-02-19 RX ADMIN — HEPARIN SODIUM 5000 UNIT(S): 5000 INJECTION INTRAVENOUS; SUBCUTANEOUS at 05:41

## 2022-02-19 RX ADMIN — HEPARIN SODIUM 5000 UNIT(S): 5000 INJECTION INTRAVENOUS; SUBCUTANEOUS at 12:33

## 2022-02-19 RX ADMIN — GABAPENTIN 300 MILLIGRAM(S): 400 CAPSULE ORAL at 17:00

## 2022-02-19 RX ADMIN — HEPARIN SODIUM 5000 UNIT(S): 5000 INJECTION INTRAVENOUS; SUBCUTANEOUS at 21:15

## 2022-02-19 RX ADMIN — ATORVASTATIN CALCIUM 40 MILLIGRAM(S): 80 TABLET, FILM COATED ORAL at 21:14

## 2022-02-19 RX ADMIN — AMLODIPINE BESYLATE 10 MILLIGRAM(S): 2.5 TABLET ORAL at 05:40

## 2022-02-19 NOTE — PROGRESS NOTE ADULT - ASSESSMENT
patient is a 66 year old Male with PMH HTN, HLD, DM presenting with exertional CP x1 day. Has had intermittent CP for past two weeks, usually resolves. Today with pain across chest, some dull radiation to back, occurred while he was taking out the trash. Also developed some SOB with pain today. Pain is non positional, non pleuritic. No associated N/V or diaphoresis. Denies syncope or new LE edema. Seen at PCP and referred to ED. Received sublingual nitroglycerin and aspirin en route, with some relief of pain. Denies fevers, chills, diarrhea, abdominal pain, URI symptoms, urinary symptoms, dark or bloody BMs.      Chest pain   --R/O ACS  --Serial CE and KEG  --Monitor on Tele  --Echo-- order is in, pending  --Stress test positive  --Plan for cardiac cath on Sunday  --Card eval called  --C/w ASA and statin  --Monitor hemodynamics    # HLD  --STATIN  --Check lipid panel -- unremarkable   --Dash diet     # DM  --C/w sliding scale  --Hold oral meds  --Check A1C --> 6.0--> outpatient follow up   --Avoid hypo/hyperglycemia         DVT and GI PPX    patient is a 66 year old Male with PMH HTN, HLD, DM presenting with exertional CP x1 day. Has had intermittent CP for past two weeks, usually resolves. Today with pain across chest, some dull radiation to back, occurred while he was taking out the trash. Also developed some SOB with pain today. Pain is non positional, non pleuritic. No associated N/V or diaphoresis. Denies syncope or new LE edema. Seen at PCP and referred to ED. Received sublingual nitroglycerin and aspirin en route, with some relief of pain. Denies fevers, chills, diarrhea, abdominal pain, URI symptoms, urinary symptoms, dark or bloody BMs.      Chest pain   --R/O ACS  --Serial CE and KEG  --Monitor on Tele  --Echo-- order is in, pending  --Stress test positive  --Plan for cardiac cath on Sunday  --Card eval called  --C/w ASA and statin  --Monitor hemodynamics  --Asymptomatic bradycardia, cont to monitor PT, VS, HR and on Tele closely     # HLD  --STATIN  --Check lipid panel -- unremarkable   --Dash diet     # DM  --C/w sliding scale  --Hold oral meds  --Check A1C --> 6.0--> outpatient follow up   --Avoid hypo/hyperglycemia         DVT and GI PPX

## 2022-02-19 NOTE — PROGRESS NOTE ADULT - SUBJECTIVE AND OBJECTIVE BOX
Name of Patient : JOLLY LOZA  MRN: [id number]  Date of visit: 02-19-22 @ 15:16      Subjective: Patient seen and examined. No new events except as noted.   Patient denies chest pain, palpitations, shortness of breath or difficulty breathing at time of visit.   No Complaints.     REVIEW OF SYSTEMS:    CONSTITUTIONAL: No weakness, fevers or chills  EYES/ENT: No visual changes;  No vertigo or throat pain   NECK: No pain or stiffness  RESPIRATORY: No cough, wheezing, hemoptysis; No shortness of breath  CARDIOVASCULAR: Denies chest pain or palpitations  GASTROINTESTINAL: No abdominal or epigastric pain. No nausea, vomiting, or hematemesis; No diarrhea or constipation. No melena or hematochezia.  GENITOURINARY: No dysuria, frequency or hematuria  NEUROLOGICAL: No numbness or weakness  SKIN: No itching, burning, rashes, or lesions   All other review of systems is negative unless indicated above.    MEDICATIONS:  MEDICATIONS  (STANDING):  amLODIPine   Tablet 10 milliGRAM(s) Oral daily  aspirin  chewable 81 milliGRAM(s) Oral daily  atorvastatin 40 milliGRAM(s) Oral at bedtime  dextrose 40% Gel 15 Gram(s) Oral once  dextrose 5%. 1000 milliLiter(s) (50 mL/Hr) IV Continuous <Continuous>  dextrose 5%. 1000 milliLiter(s) (100 mL/Hr) IV Continuous <Continuous>  dextrose 50% Injectable 25 Gram(s) IV Push once  dextrose 50% Injectable 12.5 Gram(s) IV Push once  dextrose 50% Injectable 25 Gram(s) IV Push once  gabapentin 300 milliGRAM(s) Oral two times a day  glucagon  Injectable 1 milliGRAM(s) IntraMuscular once  heparin   Injectable 5000 Unit(s) SubCutaneous every 8 hours  insulin lispro (ADMELOG) corrective regimen sliding scale   SubCutaneous three times a day before meals  insulin lispro (ADMELOG) corrective regimen sliding scale   SubCutaneous at bedtime      PHYSICAL EXAM:  T(C): 36.7 (02-19-22 @ 12:34), Max: 36.9 (02-19-22 @ 04:42)  HR: 49 (02-19-22 @ 12:34) (45 - 77)  BP: 120/75 (02-19-22 @ 12:34) (117/72 - 154/91)  RR: 16 (02-19-22 @ 12:34) (16 - 16)  SpO2: 96% (02-19-22 @ 12:34) (93% - 98%)  Wt(kg): --  I&O's Summary    19 Feb 2022 07:01  -  19 Feb 2022 15:16  --------------------------------------------------------  IN: 300 mL / OUT: 0 mL / NET: 300 mL          Appearance: Normal, calm, sitting up in bed 	  HEENT:  PERRLA   Lymphatic: No lymphadenopathy   Cardiovascular: Normal S1 S2, no JVD  Respiratory: normal effort , clear  Gastrointestinal:  Soft, Non-tender  Skin: No rashes,  warm to touch  Psychiatry:  Mood & affect appropriate  Musculuskeletal: No edema          02-19-22 @ 07:01  -  02-19-22 @ 15:16  --------------------------------------------------------  IN: 300 mL / OUT: 0 mL / NET: 300 mL                            14.7   5.05  )-----------( 178      ( 19 Feb 2022 06:45 )             43.5               02-19    138  |  105  |  18  ----------------------------<  113<H>  3.9   |  22  |  0.84    Ca    9.5      19 Feb 2022 06:49    TPro  6.2  /  Alb  4.0  /  TBili  0.7  /  DBili  x   /  AST  13  /  ALT  16  /  AlkPhos  44  02-19    PT/INR - ( 17 Feb 2022 21:46 )   PT: 11.8 sec;   INR: 0.98 ratio         PTT - ( 17 Feb 2022 21:46 )  PTT:31.1 sec                            Name of Patient : JOLLY LOZA  MRN: [id number]  Date of visit: 02-19-22 @ 15:16      Subjective: Patient seen and examined. No new events except as noted.   Patient denies chest pain, palpitations, shortness of breath or difficulty breathing at time of visit.   No Complaints.     REVIEW OF SYSTEMS:    CONSTITUTIONAL: No weakness, fevers or chills  EYES/ENT: No visual changes;  No vertigo or throat pain   NECK: No pain or stiffness  RESPIRATORY: No cough, wheezing, hemoptysis; No shortness of breath  CARDIOVASCULAR: Denies chest pain or palpitations; bradycardia however asymptomatic   GASTROINTESTINAL: No abdominal or epigastric pain. No nausea, vomiting, or hematemesis; No diarrhea or constipation. No melena or hematochezia.  GENITOURINARY: No dysuria, frequency or hematuria  NEUROLOGICAL: No numbness or weakness  SKIN: No itching, burning, rashes, or lesions   All other review of systems is negative unless indicated above.    MEDICATIONS:  MEDICATIONS  (STANDING):  amLODIPine   Tablet 10 milliGRAM(s) Oral daily  aspirin  chewable 81 milliGRAM(s) Oral daily  atorvastatin 40 milliGRAM(s) Oral at bedtime  dextrose 40% Gel 15 Gram(s) Oral once  dextrose 5%. 1000 milliLiter(s) (50 mL/Hr) IV Continuous <Continuous>  dextrose 5%. 1000 milliLiter(s) (100 mL/Hr) IV Continuous <Continuous>  dextrose 50% Injectable 25 Gram(s) IV Push once  dextrose 50% Injectable 12.5 Gram(s) IV Push once  dextrose 50% Injectable 25 Gram(s) IV Push once  gabapentin 300 milliGRAM(s) Oral two times a day  glucagon  Injectable 1 milliGRAM(s) IntraMuscular once  heparin   Injectable 5000 Unit(s) SubCutaneous every 8 hours  insulin lispro (ADMELOG) corrective regimen sliding scale   SubCutaneous three times a day before meals  insulin lispro (ADMELOG) corrective regimen sliding scale   SubCutaneous at bedtime      PHYSICAL EXAM:  T(C): 36.7 (02-19-22 @ 12:34), Max: 36.9 (02-19-22 @ 04:42)  HR: 49 (02-19-22 @ 12:34) (45 - 77)  BP: 120/75 (02-19-22 @ 12:34) (117/72 - 154/91)  RR: 16 (02-19-22 @ 12:34) (16 - 16)  SpO2: 96% (02-19-22 @ 12:34) (93% - 98%)  Wt(kg): --  I&O's Summary    19 Feb 2022 07:01  -  19 Feb 2022 15:16  --------------------------------------------------------  IN: 300 mL / OUT: 0 mL / NET: 300 mL          Appearance: Normal, calm, sitting up in bed 	  HEENT:  PERRLA   Lymphatic: No lymphadenopathy   Cardiovascular: Normal S1 S2, no JVD  Respiratory: normal effort , clear  Gastrointestinal:  Soft, Non-tender  Skin: No rashes,  warm to touch  Psychiatry:  Mood & affect appropriate  Musculuskeletal: No edema          02-19-22 @ 07:01  -  02-19-22 @ 15:16  --------------------------------------------------------  IN: 300 mL / OUT: 0 mL / NET: 300 mL                            14.7   5.05  )-----------( 178      ( 19 Feb 2022 06:45 )             43.5               02-19    138  |  105  |  18  ----------------------------<  113<H>  3.9   |  22  |  0.84    Ca    9.5      19 Feb 2022 06:49    TPro  6.2  /  Alb  4.0  /  TBili  0.7  /  DBili  x   /  AST  13  /  ALT  16  /  AlkPhos  44  02-19    PT/INR - ( 17 Feb 2022 21:46 )   PT: 11.8 sec;   INR: 0.98 ratio         PTT - ( 17 Feb 2022 21:46 )  PTT:31.1 sec

## 2022-02-19 NOTE — PROGRESS NOTE ADULT - SUBJECTIVE AND OBJECTIVE BOX
DATE OF SERVICE: 02-19-22 @ 22:32    Patient is a 66y old  Male who presents with a chief complaint of Chest pain (18 Feb 2022 17:52)      INTERVAL HISTORY: feels well, no chest pain    TELEMETRY Personally reviewed: no events  	  MEDICATIONS:  amLODIPine   Tablet 10 milliGRAM(s) Oral daily        PHYSICAL EXAM:  T(C): 37 (02-19-22 @ 21:03), Max: 37 (02-19-22 @ 21:03)  HR: 50 (02-19-22 @ 21:03) (45 - 54)  BP: 113/73 (02-19-22 @ 21:03) (113/73 - 123/72)  RR: 16 (02-19-22 @ 21:03) (16 - 16)  SpO2: 96% (02-19-22 @ 21:03) (93% - 97%)  Wt(kg): --  I&O's Summary    19 Feb 2022 07:01  -  19 Feb 2022 22:32  --------------------------------------------------------  IN: 690 mL / OUT: 0 mL / NET: 690 mL          Appearance: In no distress	  HEENT:    PERRL, EOMI	  Cardiovascular:  S1 S2, No JVD  Respiratory: Lungs clear to auscultation	  Gastrointestinal:  Soft, Non-tender, + BS	  Vascularature:  No edema of LE  Psychiatric: Appropriate affect   Neuro: no acute focal deficits                               14.7   5.05  )-----------( 178      ( 19 Feb 2022 06:45 )             43.5     02-19    138  |  105  |  18  ----------------------------<  113<H>  3.9   |  22  |  0.84    Ca    9.5      19 Feb 2022 06:49    TPro  6.2  /  Alb  4.0  /  TBili  0.7  /  DBili  x   /  AST  13  /  ALT  16  /  AlkPhos  44  02-19        Labs personally reviewed      Assessment and Plan:   · Assessment	  patient is a 66 year old Male with PMH HTN, HLD, DM presenting with exertional CP x1 day. Has had intermittent CP for past two weeks, usually resolves. Today with pain across chest, some dull radiation to back, occurred while he was taking out the trash. Also developed some SOB with pain today. Pain is non positional, non pleuritic. No associated N/V or diaphoresis. Denies syncope or new LE edema. Seen at PCP and referred to ED. Received sublingual nitroglycerin and aspirin en route, with some relief of pain. Denies fevers, chills, diarrhea, abdominal pain, URI symptoms, urinary symptoms, dark or bloody BMs.      1. Chest pain   --Echo-- order is in, pending  --Stress test positive  --Plan for cardiac cath on Sunday  --C/w ASA and statin  --Monitor hemodynamics    2. HLD  --STATIN  --Check lipid panel -- unremarkable   --Dash diet     3. DM  --C/w sliding scale  --Hold oral meds  --Check A1C --> 6.0--> outpatient follow up   --Avoid hypo/hyperglycemia     4. DVT and GI PPX       Unruly Santos DO [facility]  Cardiovascular Medicine  [address] [place] [address], [address]  Office: [phone]  Cell: [phone]

## 2022-02-20 LAB
A1C WITH ESTIMATED AVERAGE GLUCOSE RESULT: 5.9 % — HIGH (ref 4–5.6)
ANION GAP SERPL CALC-SCNC: 12 MMOL/L — SIGNIFICANT CHANGE UP (ref 5–17)
BUN SERPL-MCNC: 18 MG/DL — SIGNIFICANT CHANGE UP (ref 7–23)
CALCIUM SERPL-MCNC: 9.1 MG/DL — SIGNIFICANT CHANGE UP (ref 8.4–10.5)
CHLORIDE SERPL-SCNC: 107 MMOL/L — SIGNIFICANT CHANGE UP (ref 96–108)
CO2 SERPL-SCNC: 21 MMOL/L — LOW (ref 22–31)
CREAT SERPL-MCNC: 0.76 MG/DL — SIGNIFICANT CHANGE UP (ref 0.5–1.3)
ESTIMATED AVERAGE GLUCOSE: 123 MG/DL — HIGH (ref 68–114)
GLUCOSE BLDC GLUCOMTR-MCNC: 101 MG/DL — HIGH (ref 70–99)
GLUCOSE BLDC GLUCOMTR-MCNC: 122 MG/DL — HIGH (ref 70–99)
GLUCOSE BLDC GLUCOMTR-MCNC: 133 MG/DL — HIGH (ref 70–99)
GLUCOSE BLDC GLUCOMTR-MCNC: 136 MG/DL — HIGH (ref 70–99)
GLUCOSE SERPL-MCNC: 116 MG/DL — HIGH (ref 70–99)
HCT VFR BLD CALC: 42.5 % — SIGNIFICANT CHANGE UP (ref 39–50)
HGB BLD-MCNC: 14.1 G/DL — SIGNIFICANT CHANGE UP (ref 13–17)
MAGNESIUM SERPL-MCNC: 1.9 MG/DL — SIGNIFICANT CHANGE UP (ref 1.6–2.6)
MCHC RBC-ENTMCNC: 29.7 PG — SIGNIFICANT CHANGE UP (ref 27–34)
MCHC RBC-ENTMCNC: 33.2 GM/DL — SIGNIFICANT CHANGE UP (ref 32–36)
MCV RBC AUTO: 89.5 FL — SIGNIFICANT CHANGE UP (ref 80–100)
NRBC # BLD: 0 /100 WBCS — SIGNIFICANT CHANGE UP (ref 0–0)
PHOSPHATE SERPL-MCNC: 3.4 MG/DL — SIGNIFICANT CHANGE UP (ref 2.5–4.5)
PLATELET # BLD AUTO: 190 K/UL — SIGNIFICANT CHANGE UP (ref 150–400)
POTASSIUM SERPL-MCNC: 3.6 MMOL/L — SIGNIFICANT CHANGE UP (ref 3.5–5.3)
POTASSIUM SERPL-SCNC: 3.6 MMOL/L — SIGNIFICANT CHANGE UP (ref 3.5–5.3)
RBC # BLD: 4.75 M/UL — SIGNIFICANT CHANGE UP (ref 4.2–5.8)
RBC # FLD: 12.8 % — SIGNIFICANT CHANGE UP (ref 10.3–14.5)
SODIUM SERPL-SCNC: 140 MMOL/L — SIGNIFICANT CHANGE UP (ref 135–145)
WBC # BLD: 5.7 K/UL — SIGNIFICANT CHANGE UP (ref 3.8–10.5)
WBC # FLD AUTO: 5.7 K/UL — SIGNIFICANT CHANGE UP (ref 3.8–10.5)

## 2022-02-20 RX ADMIN — ATORVASTATIN CALCIUM 40 MILLIGRAM(S): 80 TABLET, FILM COATED ORAL at 21:54

## 2022-02-20 RX ADMIN — GABAPENTIN 300 MILLIGRAM(S): 400 CAPSULE ORAL at 05:05

## 2022-02-20 RX ADMIN — AMLODIPINE BESYLATE 10 MILLIGRAM(S): 2.5 TABLET ORAL at 05:05

## 2022-02-20 RX ADMIN — HEPARIN SODIUM 5000 UNIT(S): 5000 INJECTION INTRAVENOUS; SUBCUTANEOUS at 05:05

## 2022-02-20 RX ADMIN — HEPARIN SODIUM 5000 UNIT(S): 5000 INJECTION INTRAVENOUS; SUBCUTANEOUS at 15:01

## 2022-02-20 RX ADMIN — GABAPENTIN 300 MILLIGRAM(S): 400 CAPSULE ORAL at 17:05

## 2022-02-20 RX ADMIN — HEPARIN SODIUM 5000 UNIT(S): 5000 INJECTION INTRAVENOUS; SUBCUTANEOUS at 21:56

## 2022-02-20 RX ADMIN — Medication 81 MILLIGRAM(S): at 09:27

## 2022-02-20 NOTE — PROGRESS NOTE ADULT - SUBJECTIVE AND OBJECTIVE BOX
Date of Service:  02-20-22 @ 15:16    Patient is a 66y old  Male who presents with a chief complaint of Chest pain (18 Feb 2022 17:52)      INTERVAL HISTORY: feels well    TELEMETRY Personally reviewed: SB/SR 40s-60s    REVIEW OF SYSTEMS:   CONSTITUTIONAL: No weakness  EYES/ENT: No visual changes; No throat pain  Neck: No pain or stiffness  Respiratory: No cough, wheezing, No shortness of breath  CARDIOVASCULAR: no chest pain or palpitations  GASTROINTESTINAL: No abdominal pain, no nausea, vomiting or hematemesis  GENITOURINARY: No dysuria, frequency or hematuria  NEUROLOGICAL: No stroke like symptoms  SKIN: No rashes    	  MEDICATIONS:  amLODIPine   Tablet 10 milliGRAM(s) Oral daily        PHYSICAL EXAM:  T(C): 36.7 (02-20-22 @ 15:00), Max: 37 (02-19-22 @ 21:03)  HR: 55 (02-20-22 @ 15:00) (48 - 56)  BP: 124/74 (02-20-22 @ 15:00) (102/63 - 133/73)  RR: 18 (02-20-22 @ 15:00) (15 - 18)  SpO2: 96% (02-20-22 @ 15:00) (95% - 98%)  Wt(kg): --  I&O's Summary    19 Feb 2022 07:01  -  20 Feb 2022 07:00  --------------------------------------------------------  IN: 690 mL / OUT: 0 mL / NET: 690 mL    20 Feb 2022 07:01  -  20 Feb 2022 15:16  --------------------------------------------------------  IN: 500 mL / OUT: 0 mL / NET: 500 mL          Appearance: In no distress	  HEENT:    PERRL, EOMI	  Cardiovascular:  S1 S2, No JVD  Respiratory: Lungs clear to auscultation	  Gastrointestinal:  Soft, Non-tender, + BS	  Vascularature:  No edema of LE  Psychiatric: Appropriate affect   Neuro: no acute focal deficits                               14.1   5.70  )-----------( 190      ( 20 Feb 2022 06:41 )             42.5     02-20    140  |  107  |  18  ----------------------------<  116<H>  3.6   |  21<L>  |  0.76    Ca    9.1      20 Feb 2022 06:41  Phos  3.4     02-20  Mg     1.9     02-20    TPro  6.2  /  Alb  4.0  /  TBili  0.7  /  DBili  x   /  AST  13  /  ALT  16  /  AlkPhos  44  02-19        Labs personally reviewed      ASSESSMENT/PLAN: 	    patient is a 66 year old Male with PMH HTN, HLD, DM presenting with exertional CP x1 day. Has had intermittent CP for past two weeks, usually resolves. Today with pain across chest, some dull radiation to back, occurred while he was taking out the trash. Also developed some SOB with pain today. Pain is non positional, non pleuritic. No associated N/V or diaphoresis. Denies syncope or new LE edema. Seen at PCP and referred to ED. Received sublingual nitroglycerin and aspirin en route, with some relief of pain. Denies fevers, chills, diarrhea, abdominal pain, URI symptoms, urinary symptoms, dark or bloody BMs.      1. Chest pain   --Echo-- order is in, pending  --Stress test positive  --Plan for cardiac cath today  --C/w ASA and statin  --Monitor hemodynamics    2. HLD  --STATIN  --Check lipid panel -- unremarkable   --Dash diet     3. DM  --C/w sliding scale  --Hold oral meds  --Check A1C --> 6.0--> outpatient follow up   --Avoid hypo/hyperglycemia     4. DVT and GI PPX       Anayeli Holly MSN, FNP-BC, AGACNP-BC, BANDAR  Unruly Santos, DO [facility]  Cardiovascular Medicine  [address], [address]  Office: [phone]  Cell: [phone] Date of Service:  02-20-22 @ 15:16    Patient is a 66y old  Male who presents with a chief complaint of Chest pain (18 Feb 2022 17:52)      INTERVAL HISTORY: feels well    TELEMETRY Personally reviewed: SB/SR 40s-60s    REVIEW OF SYSTEMS:   CONSTITUTIONAL: No weakness  EYES/ENT: No visual changes; No throat pain  Neck: No pain or stiffness  Respiratory: No cough, wheezing, No shortness of breath  CARDIOVASCULAR: no chest pain or palpitations  GASTROINTESTINAL: No abdominal pain, no nausea, vomiting or hematemesis  GENITOURINARY: No dysuria, frequency or hematuria  NEUROLOGICAL: No stroke like symptoms  SKIN: No rashes    	  MEDICATIONS:  amLODIPine   Tablet 10 milliGRAM(s) Oral daily        PHYSICAL EXAM:  T(C): 36.7 (02-20-22 @ 15:00), Max: 37 (02-19-22 @ 21:03)  HR: 55 (02-20-22 @ 15:00) (48 - 56)  BP: 124/74 (02-20-22 @ 15:00) (102/63 - 133/73)  RR: 18 (02-20-22 @ 15:00) (15 - 18)  SpO2: 96% (02-20-22 @ 15:00) (95% - 98%)  Wt(kg): --  I&O's Summary    19 Feb 2022 07:01  -  20 Feb 2022 07:00  --------------------------------------------------------  IN: 690 mL / OUT: 0 mL / NET: 690 mL    20 Feb 2022 07:01  -  20 Feb 2022 15:16  --------------------------------------------------------  IN: 500 mL / OUT: 0 mL / NET: 500 mL          Appearance: In no distress	  HEENT:    PERRL, EOMI	  Cardiovascular:  S1 S2, No JVD  Respiratory: Lungs clear to auscultation	  Gastrointestinal:  Soft, Non-tender, + BS	  Vascularature:  No edema of LE  Psychiatric: Appropriate affect   Neuro: no acute focal deficits                               14.1   5.70  )-----------( 190      ( 20 Feb 2022 06:41 )             42.5     02-20    140  |  107  |  18  ----------------------------<  116<H>  3.6   |  21<L>  |  0.76    Ca    9.1      20 Feb 2022 06:41  Phos  3.4     02-20  Mg     1.9     02-20    TPro  6.2  /  Alb  4.0  /  TBili  0.7  /  DBili  x   /  AST  13  /  ALT  16  /  AlkPhos  44  02-19        Labs personally reviewed      ASSESSMENT/PLAN: 	    patient is a 66 year old Male with PMH HTN, HLD, DM presenting with exertional CP x1 day. Has had intermittent CP for past two weeks, usually resolves. Today with pain across chest, some dull radiation to back, occurred while he was taking out the trash. Also developed some SOB with pain today. Pain is non positional, non pleuritic. No associated N/V or diaphoresis. Denies syncope or new LE edema. Seen at PCP and referred to ED. Received sublingual nitroglycerin and aspirin en route, with some relief of pain. Denies fevers, chills, diarrhea, abdominal pain, URI symptoms, urinary symptoms, dark or bloody BMs.      1. Chest pain   --Echo-- order is in, pending  --Stress test positive  --s/p cardiac cath with mild dz onlt  --C/w ASA and statin  --Monitor hemodynamics    2. HLD  --STATIN  --Check lipid panel -- unremarkable   --Dash diet     3. DM  --C/w sliding scale  --Hold oral meds  --Check A1C --> 6.0--> outpatient follow up   --Avoid hypo/hyperglycemia     4. DVT and GI PPX     Discharge planning      Anayeli Holly MSN, FNP-BC, AGACNP-BC, BANDAR  Unruly Santos DO [facility]  Cardiovascular Medicine  [address] [facility], [address]  Office: [phone]  Cell: [phone]

## 2022-02-20 NOTE — PROGRESS NOTE ADULT - SUBJECTIVE AND OBJECTIVE BOX
Name of Patient : JOLLY LOZA  MRN: [id number]  Date of visit: 02-20-22       Subjective: Patient seen and examined. No new events except as noted.     REVIEW OF SYSTEMS:    CONSTITUTIONAL: No weakness, fevers or chills  EYES/ENT: No visual changes;  No vertigo or throat pain   NECK: No pain or stiffness  RESPIRATORY: No cough, wheezing, hemoptysis; No shortness of breath  CARDIOVASCULAR: No chest pain or palpitations  GASTROINTESTINAL: No abdominal or epigastric pain. No nausea, vomiting, or hematemesis; No diarrhea or constipation. No melena or hematochezia.  GENITOURINARY: No dysuria, frequency or hematuria  NEUROLOGICAL: No numbness or weakness  SKIN: No itching, burning, rashes, or lesions   All other review of systems is negative unless indicated above.    MEDICATIONS:  MEDICATIONS  (STANDING):  amLODIPine   Tablet 10 milliGRAM(s) Oral daily  aspirin  chewable 81 milliGRAM(s) Oral daily  atorvastatin 40 milliGRAM(s) Oral at bedtime  dextrose 40% Gel 15 Gram(s) Oral once  dextrose 5%. 1000 milliLiter(s) (50 mL/Hr) IV Continuous <Continuous>  dextrose 5%. 1000 milliLiter(s) (100 mL/Hr) IV Continuous <Continuous>  dextrose 50% Injectable 25 Gram(s) IV Push once  dextrose 50% Injectable 12.5 Gram(s) IV Push once  dextrose 50% Injectable 25 Gram(s) IV Push once  gabapentin 300 milliGRAM(s) Oral two times a day  glucagon  Injectable 1 milliGRAM(s) IntraMuscular once  heparin   Injectable 5000 Unit(s) SubCutaneous every 8 hours  insulin lispro (ADMELOG) corrective regimen sliding scale   SubCutaneous three times a day before meals  insulin lispro (ADMELOG) corrective regimen sliding scale   SubCutaneous at bedtime      PHYSICAL EXAM:  T(C): 36.9 (02-20-22 @ 21:48), Max: 36.9 (02-20-22 @ 21:48)  HR: 51 (02-20-22 @ 21:48) (48 - 56)  BP: 118/76 (02-20-22 @ 21:48) (102/63 - 133/73)  RR: 19 (02-20-22 @ 21:48) (15 - 19)  SpO2: 97% (02-20-22 @ 21:48) (95% - 98%)  Wt(kg): --  I&O's Summary    19 Feb 2022 07:01  -  20 Feb 2022 07:00  --------------------------------------------------------  IN: 690 mL / OUT: 0 mL / NET: 690 mL    20 Feb 2022 07:01  -  20 Feb 2022 21:59  --------------------------------------------------------  IN: 500 mL / OUT: 0 mL / NET: 500 mL          Appearance: Normal	  HEENT:  PERRLA   Lymphatic: No lymphadenopathy   Cardiovascular: Normal S1 S2, no JVD  Respiratory: normal effort , clear  Gastrointestinal:  Soft, Non-tender  Skin: No rashes,  warm to touch  Psychiatry:  Mood & affect appropriate  Musculuskeletal: No edema      All labs, Imaging and EKGs personally reviewed     02-19-22 @ 07:01  -  02-20-22 @ 07:00  --------------------------------------------------------  IN: 690 mL / OUT: 0 mL / NET: 690 mL    02-20-22 @ 07:01  -  02-20-22 @ 21:59  --------------------------------------------------------  IN: 500 mL / OUT: 0 mL / NET: 500 mL                          14.1   5.70  )-----------( 190      ( 20 Feb 2022 06:41 )             42.5               02-20    140  |  107  |  18  ----------------------------<  116<H>  3.6   |  21<L>  |  0.76    Ca    9.1      20 Feb 2022 06:41  Phos  3.4     02-20  Mg     1.9     02-20    TPro  6.2  /  Alb  4.0  /  TBili  0.7  /  DBili  x   /  AST  13  /  ALT  16  /  AlkPhos  44  02-19

## 2022-02-20 NOTE — PROGRESS NOTE ADULT - ASSESSMENT
patient is a 66 year old Male with PMH HTN, HLD, DM presenting with exertional CP x1 day. Has had intermittent CP for past two weeks, usually resolves. Today with pain across chest, some dull radiation to back, occurred while he was taking out the trash. Also developed some SOB with pain today. Pain is non positional, non pleuritic. No associated N/V or diaphoresis. Denies syncope or new LE edema. Seen at PCP and referred to ED. Received sublingual nitroglycerin and aspirin en route, with some relief of pain. Denies fevers, chills, diarrhea, abdominal pain, URI symptoms, urinary symptoms, dark or bloody BMs.      Chest pain   --R/O ACS  --Serial CE and KEG  --Monitor on Tele  --Echo  --Stress test positive  --Plan for cardiac cath   --Card eval called  --C/w ASA and statin  --Monitor hemodynamics  --Asymptomatic bradycardia, cont to monitor PT, VS, HR and on Tele closely     # HLD  --STATIN  --Check lipid panel -- unremarkable   --Dash diet     # DM  --C/w sliding scale  --Hold oral meds  --Check A1C --> 6.0--> outpatient follow up   --Avoid hypo/hyperglycemia         DVT and GI PPX

## 2022-02-21 ENCOUNTER — TRANSCRIPTION ENCOUNTER (OUTPATIENT)
Age: 67
End: 2022-02-21

## 2022-02-21 VITALS
HEART RATE: 61 BPM | SYSTOLIC BLOOD PRESSURE: 149 MMHG | OXYGEN SATURATION: 96 % | TEMPERATURE: 98 F | RESPIRATION RATE: 18 BRPM | DIASTOLIC BLOOD PRESSURE: 81 MMHG

## 2022-02-21 LAB
ANION GAP SERPL CALC-SCNC: 11 MMOL/L — SIGNIFICANT CHANGE UP (ref 5–17)
BUN SERPL-MCNC: 18 MG/DL — SIGNIFICANT CHANGE UP (ref 7–23)
CALCIUM SERPL-MCNC: 9.4 MG/DL — SIGNIFICANT CHANGE UP (ref 8.4–10.5)
CHLORIDE SERPL-SCNC: 105 MMOL/L — SIGNIFICANT CHANGE UP (ref 96–108)
CO2 SERPL-SCNC: 22 MMOL/L — SIGNIFICANT CHANGE UP (ref 22–31)
CREAT SERPL-MCNC: 0.76 MG/DL — SIGNIFICANT CHANGE UP (ref 0.5–1.3)
GLUCOSE BLDC GLUCOMTR-MCNC: 103 MG/DL — HIGH (ref 70–99)
GLUCOSE SERPL-MCNC: 107 MG/DL — HIGH (ref 70–99)
POTASSIUM SERPL-MCNC: 4.3 MMOL/L — SIGNIFICANT CHANGE UP (ref 3.5–5.3)
POTASSIUM SERPL-SCNC: 4.3 MMOL/L — SIGNIFICANT CHANGE UP (ref 3.5–5.3)
SODIUM SERPL-SCNC: 138 MMOL/L — SIGNIFICANT CHANGE UP (ref 135–145)

## 2022-02-21 PROCEDURE — 85025 COMPLETE CBC W/AUTO DIFF WBC: CPT

## 2022-02-21 PROCEDURE — 85610 PROTHROMBIN TIME: CPT

## 2022-02-21 PROCEDURE — 84443 ASSAY THYROID STIM HORMONE: CPT

## 2022-02-21 PROCEDURE — 80053 COMPREHEN METABOLIC PANEL: CPT

## 2022-02-21 PROCEDURE — 82962 GLUCOSE BLOOD TEST: CPT

## 2022-02-21 PROCEDURE — 85027 COMPLETE CBC AUTOMATED: CPT

## 2022-02-21 PROCEDURE — G0378: CPT

## 2022-02-21 PROCEDURE — 78452 HT MUSCLE IMAGE SPECT MULT: CPT | Mod: ME

## 2022-02-21 PROCEDURE — 96375 TX/PRO/DX INJ NEW DRUG ADDON: CPT

## 2022-02-21 PROCEDURE — C1769: CPT

## 2022-02-21 PROCEDURE — 80048 BASIC METABOLIC PNL TOTAL CA: CPT

## 2022-02-21 PROCEDURE — 93454 CORONARY ARTERY ANGIO S&I: CPT

## 2022-02-21 PROCEDURE — 71045 X-RAY EXAM CHEST 1 VIEW: CPT

## 2022-02-21 PROCEDURE — 84484 ASSAY OF TROPONIN QUANT: CPT

## 2022-02-21 PROCEDURE — 85730 THROMBOPLASTIN TIME PARTIAL: CPT

## 2022-02-21 PROCEDURE — 84100 ASSAY OF PHOSPHORUS: CPT

## 2022-02-21 PROCEDURE — C1894: CPT

## 2022-02-21 PROCEDURE — 93017 CV STRESS TEST TRACING ONLY: CPT

## 2022-02-21 PROCEDURE — 83690 ASSAY OF LIPASE: CPT

## 2022-02-21 PROCEDURE — 93005 ELECTROCARDIOGRAM TRACING: CPT

## 2022-02-21 PROCEDURE — 83036 HEMOGLOBIN GLYCOSYLATED A1C: CPT

## 2022-02-21 PROCEDURE — 99285 EMERGENCY DEPT VISIT HI MDM: CPT | Mod: 25

## 2022-02-21 PROCEDURE — A9500: CPT

## 2022-02-21 PROCEDURE — 86803 HEPATITIS C AB TEST: CPT

## 2022-02-21 PROCEDURE — C1887: CPT

## 2022-02-21 PROCEDURE — 76705 ECHO EXAM OF ABDOMEN: CPT

## 2022-02-21 PROCEDURE — 83735 ASSAY OF MAGNESIUM: CPT

## 2022-02-21 PROCEDURE — U0003: CPT

## 2022-02-21 PROCEDURE — 96374 THER/PROPH/DIAG INJ IV PUSH: CPT

## 2022-02-21 PROCEDURE — 80061 LIPID PANEL: CPT

## 2022-02-21 PROCEDURE — G1004: CPT

## 2022-02-21 PROCEDURE — 36415 COLL VENOUS BLD VENIPUNCTURE: CPT

## 2022-02-21 PROCEDURE — U0005: CPT

## 2022-02-21 RX ADMIN — AMLODIPINE BESYLATE 10 MILLIGRAM(S): 2.5 TABLET ORAL at 05:02

## 2022-02-21 RX ADMIN — GABAPENTIN 300 MILLIGRAM(S): 400 CAPSULE ORAL at 05:02

## 2022-02-21 RX ADMIN — HEPARIN SODIUM 5000 UNIT(S): 5000 INJECTION INTRAVENOUS; SUBCUTANEOUS at 05:02

## 2022-02-21 RX ADMIN — Medication 81 MILLIGRAM(S): at 11:27

## 2022-02-21 NOTE — DISCHARGE NOTE PROVIDER - NSDCMRMEDTOKEN_GEN_ALL_CORE_FT
amLODIPine 10 mg oral tablet: 1 tab(s) orally once a day  famotidine:   gabapentin 100 mg oral capsule: 3 cap(s) orally 2 times a day  metFORMIN 500 mg oral tablet: 1 tab(s) orally once a day

## 2022-02-21 NOTE — PROGRESS NOTE ADULT - ATTENDING COMMENTS
Pt care and plan discussed and reviewed with NP. Plan as outlined above edited by me to reflect our discussion.
Pt care and plan discussed and reviewed with PA. Plan as outlined above edited by me to reflect our discussion. Thirty five minutes spent on encounter, of which more than fifty percent of the encounter was spent on counseling and/or coordinating care by the attending physician.
Pt care and plan discussed and reviewed with PA. Plan as outlined above edited by me to reflect our discussion. Thirty five minutes spent on encounter, of which more than fifty percent of the encounter was spent on counseling and/or coordinating care by the attending physician.

## 2022-02-21 NOTE — DISCHARGE NOTE PROVIDER - PROVIDER TOKENS
PROVIDER:[TOKEN:[[id number]],FOLLOWUP:[1 week]],PROVIDER:[TOKEN:[[id number]],FOLLOWUP:[1 week]]

## 2022-02-21 NOTE — PROGRESS NOTE ADULT - ASSESSMENT
patient is a 66 year old Male with PMH HTN, HLD, DM presenting with exertional CP x1 day. Has had intermittent CP for past two weeks, usually resolves. Today with pain across chest, some dull radiation to back, occurred while he was taking out the trash. Also developed some SOB with pain today. Pain is non positional, non pleuritic. No associated N/V or diaphoresis. Denies syncope or new LE edema. Seen at PCP and referred to ED. Received sublingual nitroglycerin and aspirin en route, with some relief of pain. Denies fevers, chills, diarrhea, abdominal pain, URI symptoms, urinary symptoms, dark or bloody BMs.      Chest pain   --R/O ACS  --Serial CE and KEG  --Monitor on Tele  --Echo  --Stress test positive  --Plan for cardiac cath   --Card eval called  --C/w ASA and statin  --Monitor hemodynamics  --Asymptomatic bradycardia, cont to monitor PT, VS, HR and on Tele closely   --Outpatient cardio follow up     # HLD  --STATIN  --Check lipid panel -- unremarkable   --Dash diet     # DM  --C/w sliding scale  --Hold oral meds  --Check A1C --> 6.0--> outpatient follow up   --Avoid hypo/hyperglycemia         DVT and GI PPX

## 2022-02-21 NOTE — PROGRESS NOTE ADULT - SUBJECTIVE AND OBJECTIVE BOX
Name of Patient : JOLLY LOZA  MRN: [id number]  Date of visit: 02-21-22 @ 12:11      Subjective: Patient seen and examined. No new events except as noted.   Patient denies any CP, palpitations, SOB or difficulty breathing.   Doing okay.   Discharge planning for today.     REVIEW OF SYSTEMS:    CONSTITUTIONAL: No weakness, fevers or chills  EYES/ENT: No visual changes;  No vertigo or throat pain   NECK: No pain or stiffness  RESPIRATORY: No cough, wheezing, hemoptysis; No shortness of breath  CARDIOVASCULAR: No chest pain or palpitations  GASTROINTESTINAL: No abdominal or epigastric pain. No nausea, vomiting, or hematemesis; No diarrhea or constipation. No melena or hematochezia.  GENITOURINARY: No dysuria, frequency or hematuria  NEUROLOGICAL: No numbness or weakness  SKIN: No itching, burning, rashes, or lesions   All other review of systems is negative unless indicated above.    MEDICATIONS:  MEDICATIONS  (STANDING):  amLODIPine   Tablet 10 milliGRAM(s) Oral daily  aspirin  chewable 81 milliGRAM(s) Oral daily  atorvastatin 40 milliGRAM(s) Oral at bedtime  dextrose 40% Gel 15 Gram(s) Oral once  dextrose 5%. 1000 milliLiter(s) (50 mL/Hr) IV Continuous <Continuous>  dextrose 5%. 1000 milliLiter(s) (100 mL/Hr) IV Continuous <Continuous>  dextrose 50% Injectable 25 Gram(s) IV Push once  dextrose 50% Injectable 12.5 Gram(s) IV Push once  dextrose 50% Injectable 25 Gram(s) IV Push once  gabapentin 300 milliGRAM(s) Oral two times a day  glucagon  Injectable 1 milliGRAM(s) IntraMuscular once  heparin   Injectable 5000 Unit(s) SubCutaneous every 8 hours  insulin lispro (ADMELOG) corrective regimen sliding scale   SubCutaneous three times a day before meals  insulin lispro (ADMELOG) corrective regimen sliding scale   SubCutaneous at bedtime      PHYSICAL EXAM:  T(C): 36.6 (02-21-22 @ 11:00), Max: 36.9 (02-20-22 @ 21:48)  HR: 61 (02-21-22 @ 11:00) (48 - 61)  BP: 149/81 (02-21-22 @ 11:00) (115/72 - 149/81)  RR: 18 (02-21-22 @ 11:00) (15 - 19)  SpO2: 96% (02-21-22 @ 11:00) (96% - 97%)  Wt(kg): --  I&O's Summary    20 Feb 2022 07:01  -  21 Feb 2022 07:00  --------------------------------------------------------  IN: 680 mL / OUT: 0 mL / NET: 680 mL    21 Feb 2022 07:01  -  21 Feb 2022 12:11  --------------------------------------------------------  IN: 360 mL / OUT: 0 mL / NET: 360 mL          Appearance: Normal	  HEENT:  PERRLA   Lymphatic: No lymphadenopathy   Cardiovascular: Normal S1 S2, no JVD  Respiratory: normal effort , clear  Gastrointestinal:  Soft, Non-tender  Skin: No rashes,  warm to touch  Psychiatry:  Mood & affect appropriate  Musculuskeletal: No edema          02-20-22 @ 07:01  -  02-21-22 @ 07:00  --------------------------------------------------------  IN: 680 mL / OUT: 0 mL / NET: 680 mL    02-21-22 @ 07:01  -  02-21-22 @ 12:11  --------------------------------------------------------  IN: 360 mL / OUT: 0 mL / NET: 360 mL                              14.1   5.70  )-----------( 190      ( 20 Feb 2022 06:41 )             42.5               02-21    138  |  105  |  18  ----------------------------<  107<H>  4.3   |  22  |  0.76    Ca    9.4      21 Feb 2022 05:55  Phos  3.4     02-20  Mg     1.9     02-20

## 2022-02-21 NOTE — PROGRESS NOTE ADULT - SUBJECTIVE AND OBJECTIVE BOX
DATE OF SERVICE: 02-21-22      Patient is a 66y old  Male who presents with a chief complaint of Chest pain (18 Feb 2022 17:52)      INTERVAL HISTORY: feels ok    	  MEDICATIONS:  amLODIPine   Tablet 10 milliGRAM(s) Oral daily        PHYSICAL EXAM:  T(C): 36.6 (02-21-22 @ 11:00), Max: 36.7 (02-21-22 @ 04:06)  HR: 61 (02-21-22 @ 11:00) (52 - 61)  BP: 149/81 (02-21-22 @ 11:00) (134/74 - 149/81)  RR: 18 (02-21-22 @ 11:00) (18 - 18)  SpO2: 96% (02-21-22 @ 11:00) (96% - 97%)  Wt(kg): --  I&O's Summary    20 Feb 2022 07:01  -  21 Feb 2022 07:00  --------------------------------------------------------  IN: 680 mL / OUT: 0 mL / NET: 680 mL    21 Feb 2022 07:01  -  21 Feb 2022 23:24  --------------------------------------------------------  IN: 360 mL / OUT: 0 mL / NET: 360 mL          Appearance: In no distress	  HEENT:    PERRL, EOMI	  Cardiovascular:  S1 S2, No JVD  Respiratory: Lungs clear to auscultation	  Gastrointestinal:  Soft, Non-tender, + BS	  Vascularature:  No edema of LE  Psychiatric: Appropriate affect   Neuro: no acute focal deficits                               14.1   5.70  )-----------( 190      ( 20 Feb 2022 06:41 )             42.5     02-21    138  |  105  |  18  ----------------------------<  107<H>  4.3   |  22  |  0.76    Ca    9.4      21 Feb 2022 05:55  Phos  3.4     02-20  Mg     1.9     02-20          Labs personally reviewed      ASSESSMENT/PLAN: 	    patient is a 66 year old Male with PMH HTN, HLD, DM presenting with exertional CP x1 day. Has had intermittent CP for past two weeks, usually resolves. Today with pain across chest, some dull radiation to back, occurred while he was taking out the trash. Also developed some SOB with pain today. Pain is non positional, non pleuritic. No associated N/V or diaphoresis. Denies syncope or new LE edema. Seen at PCP and referred to ED. Received sublingual nitroglycerin and aspirin en route, with some relief of pain. Denies fevers, chills, diarrhea, abdominal pain, URI symptoms, urinary symptoms, dark or bloody BMs.      1. Chest pain   --Echo-- order is in, pending  --Stress test positive  --s/p cardiac cath with mild dz onlt  --C/w ASA and statin  --Monitor hemodynamics    2. HLD  --STATIN  --Check lipid panel -- unremarkable   --Dash diet     3. DM  --C/w sliding scale  --Hold oral meds  --Check A1C --> 6.0--> outpatient follow up   --Avoid hypo/hyperglycemia     4. DVT and GI PPX     Discharge planning          Unruly Santos DO [facility]  Cardiovascular Medicine  [address] [place] [address], [address]  Office: [phone]  Cell: [phone]

## 2022-02-21 NOTE — CHART NOTE - NSCHARTNOTEFT_GEN_A_CORE
Discussed case with Dr. Santos and Dr. Jimenez.   Patient is medically cleared for discharge home with no PT Needs.   Instructed to follow up with cardiology Dr. Santos as outpatient.   All meds reviewed and reconciled.   Discussed with patient at bedside and son Maurice over the phone.     STERLING Braxton   Dept of Medicine   [id number]

## 2022-02-21 NOTE — DISCHARGE NOTE PROVIDER - CARE PROVIDER_API CALL
Kristy Dwyer (MD)  Internal Medicine  [address] [place] [address]  Phone: [phone]  Fax: [phone]  Follow Up Time: 1 week    Unruly Santos (DO)  Cardiovascular Disease; Internal Medicine; Nuclear Cardiology  [address] [place] [address], [address] [address]  Phone: [phone]  Fax: [phone]  Follow Up Time: 1 week

## 2022-02-21 NOTE — DISCHARGE NOTE NURSING/CASE MANAGEMENT/SOCIAL WORK - PATIENT PORTAL LINK FT
You can access the FollowMyHealth Patient Portal offered by [facility] by registering at the following website: [email or web address]. By joining [facility]’s FollowMyHealth portal, you will also be able to view your health information using other applications (apps) compatible with our system.

## 2022-02-21 NOTE — DISCHARGE NOTE PROVIDER - HOSPITAL COURSE
67 y/o M with PMHx of HTN, HLD, DM presenting with exertional CP x1 day. Has had intermittent CP for past two weeks, usually resolves. Today with pain across chest, some dull radiation to back, occurred while he was taking out the trash. Also developed some SOB with pain today. Pain is non positional, non pleuritic. No associated N/V or diaphoresis. Denies syncope or new LE edema. Seen at PCP and referred to ED. Received sublingual nitroglycerin and aspirin en route, with some relief of pain. Denies fevers, chills, diarrhea, abdominal pain, URI symptoms, urinary symptoms, dark or bloody BMs.   Pt underwent a NST which showed:The left ventricle was normal in size. There is a  medium-sized, moderate defect in the basal anterior wall  that is  reversible consistent with ischemia.  There is a  medium-sized, moderate defect in the basal inferolateral  wall that is fixed, consistent with infarct.    Pt underwent a LHC on 2/20 via RRA which showed luminal irregularities. Recommended medical management.

## 2022-02-21 NOTE — DISCHARGE NOTE PROVIDER - NSDCCPCAREPLAN_GEN_ALL_CORE_FT
PRINCIPAL DISCHARGE DIAGNOSIS  Diagnosis: Chest pain  Assessment and Plan of Treatment: You underwent a cardiac angiogram.   Cardiac catheterization or coronary angiogram is done to understand how the heart is working and to look at the coronary arteries which supply oxygen to the heart muscles, to look at the heart chambers and the valves in the heart.  Continue your medications. Do not stop Aspirin unless instructed by your cardiologist.   No heavy lifting, strenuous activity, bending, straining or unnecessary stair climbing  for 2 weeks. No sex for 1 week.  No driving for 2 days. You may shower 24 hours following procedure but avoid baths and swimming for 1 week. Check groin site for bleeding and/or swelling daily following procedure. Call your doctor/cardiologist immediately should it occur or if you have increased/persistent pain at the site. Follow up with your cardiologist in 1- 2 weeks. You may call [facility] at [phone] or [phone] after office hours and weekends  with any questions or concerns following your procedure. Take medications as prescribed.  You will need to see your doctor within one week after discharge  Call your doctor if the insertion site bleeds a lot, if you get a fever or have pain, swelling, or redness where the tube went in, or if your leg feels weak, numb, or cold.

## 2022-02-21 NOTE — PROGRESS NOTE ADULT - PROVIDER SPECIALTY LIST ADULT
Cardiology
Internal Medicine
Pharmacy -- Admission Medication Reconciliation    Prior to admission (PTA) medications were reviewed and the patient's PTA medication list was updated.    Sources Consulted: [place] [facility], [employer]    The reliability of this Medication Reconciliation is: Reliability: Reliable    The following significant changes were made:  -Norvasc updated to QHS  -Alphagan removed  -Tylenol updated to 500 mg QID  -Maalox added  -Macrobid removed  -Prednisolone updated to 1 drop both eyes daily    FYI:  11/5/18: Augmentin x10 days  11/7/18: Macrobid x 7 days + Doxycycline x10 daily    In addition, the patient's allergies were reviewed with the patient and updated as follows:   Allergies: Atorvastatin; Ciprofloxacin; Erythromycin; Lisinopril; Simvastatin; and Sulfamethoxazole w/trimethoprim    The pharmacist has reviewed with the patient that all personal medications should be removed from the building or locked in the belongings safe.  Patient shall only take medications ordered by the physician and administered by the nursing staff.      Medication barriers identified: none, medications administered at [facility]    Medication adherence concerns: n/a    Understanding of emergency medications: n/a      Jimenez [facility], 12/13/2018,  4:37 PM    
Cardiology
Cardiology
Internal Medicine
Internal Medicine

## 2022-03-04 RX ORDER — GABAPENTIN 400 MG/1
3 CAPSULE ORAL
Qty: 0 | Refills: 0 | DISCHARGE

## 2022-03-04 RX ORDER — LISINOPRIL 2.5 MG/1
1 TABLET ORAL
Qty: 0 | Refills: 0 | DISCHARGE

## 2022-03-04 RX ORDER — METFORMIN HYDROCHLORIDE 850 MG/1
1 TABLET ORAL
Qty: 0 | Refills: 0 | DISCHARGE

## 2022-03-04 RX ORDER — AMLODIPINE BESYLATE 2.5 MG/1
1 TABLET ORAL
Qty: 0 | Refills: 0 | DISCHARGE

## 2022-03-04 RX ORDER — ATORVASTATIN CALCIUM 80 MG/1
1 TABLET, FILM COATED ORAL
Qty: 0 | Refills: 0 | DISCHARGE

## 2022-03-04 RX ORDER — FAMOTIDINE 10 MG/ML
0 INJECTION INTRAVENOUS
Qty: 0 | Refills: 0 | DISCHARGE

## 2022-03-04 RX ORDER — DICLOFENAC SODIUM 75 MG/1
1 TABLET, DELAYED RELEASE ORAL
Qty: 0 | Refills: 0 | DISCHARGE

## 2022-03-04 RX ORDER — GABAPENTIN 400 MG/1
1 CAPSULE ORAL
Qty: 0 | Refills: 0 | DISCHARGE

## 2022-03-06 NOTE — ED PROVIDER NOTE - NS ED ROS FT
CONST: no fevers, no chills  EYES: no pain, no vision changes  ENT: no sore throat, no ear pain, no change in hearing  CV: + chest pain, no leg swelling  RESP: + shortness of breath, no cough  ABD: no abdominal pain, no nausea, no vomiting, no diarrhea  : no dysuria, no flank pain, no hematuria  MSK: no back pain, no extremity pain  NEURO: no headache or additional neurologic complaints  HEME: no easy bleeding  SKIN:  no rash
normal...

## 2023-10-23 NOTE — ED ADULT NURSE NOTE - OBJECTIVE STATEMENT
67 yo M BIBEMS with PMHx of GERD, HLD, HTN, and DM on Metformin/Insulin HS only p/w 2-3 days of chest pressure on exertion. pt endorses taking 324 of ASA prior to arrival. pt denies current chest pressure. pt endorses pain in b/l ant chest, back and epigastrium when exerting himself.  pt is A&Ox4, MAEW, PERRL, abd soft non tender, LS CTA, no peripheral edema, skin is warm dry intact/normal for race.  Pt denies: headache, dizziness, current chest pain, syncope, diaphoresis, palpitations, cough, SOB, abdominal pain, n/v/d, urinary symptoms, fevers, chills, weakness at this time.
normal

## 2024-01-27 NOTE — ED ADULT NURSE REASSESSMENT NOTE - NS ED NURSE REASSESS COMMENT FT1
Consult received, chart reviewed and note critically low hemoglobin resulted today as 4.9, down from 7.0 yesterday. PT will hold, follow and see when appropriate for the eval. Thank you, Clementina Friedman, PT   Pt received from OLIVIA Menon. Pt oriented to CDU & plan of care was discussed. Pt endorses 2/10 chest pain described as pressure. Pt also endorses mid back pain that started with the chest pain. Pt also states he has SOB-- pt speaking in full coherent sentences. No work of breathing noted. Sating >98% on room air. Pt denies any dizziness or palpitations. Safety & comfort measures maintained. Call bell in reach. Will continue to monitor.

## 2024-11-21 ENCOUNTER — DOCTOR'S OFFICE (OUTPATIENT)
Facility: LOCATION | Age: 69
Setting detail: OPHTHALMOLOGY
End: 2024-11-21
Payer: MEDICARE

## 2024-11-21 DIAGNOSIS — H25.13: ICD-10-CM

## 2024-11-21 DIAGNOSIS — L71.1: ICD-10-CM

## 2024-11-21 DIAGNOSIS — H02.31: ICD-10-CM

## 2024-11-21 DIAGNOSIS — H52.4: ICD-10-CM

## 2024-11-21 DIAGNOSIS — E10.9: ICD-10-CM

## 2024-11-21 DIAGNOSIS — H02.34: ICD-10-CM

## 2024-11-21 DIAGNOSIS — H11.153: ICD-10-CM

## 2024-11-21 PROCEDURE — 92015 DETERMINE REFRACTIVE STATE: CPT | Performed by: OPHTHALMOLOGY

## 2024-11-21 PROCEDURE — 92004 COMPRE OPH EXAM NEW PT 1/>: CPT | Performed by: OPHTHALMOLOGY

## 2024-11-21 ASSESSMENT — REFRACTION_MANIFEST
OS_VA1: 20/40+
OD_AXIS: 015
OD_SPHERE: +3.25
OS_ADD: +3.25
OD_ADD: +3.25
OS_CYLINDER: +1.25
OD_VA1: 20/20
OD_CYLINDER: +1.00
OS_AXIS: 155
OS_SPHERE: +1.25

## 2024-11-21 ASSESSMENT — KERATOMETRY
OS_AXISANGLE_DEGREES: 026
OD_AXISANGLE_DEGREES: 090
OS_K2POWER_DIOPTERS: 42.25
OS_K1POWER_DIOPTERS: 41.75
OD_K1POWER_DIOPTERS: 41.75
OD_K2POWER_DIOPTERS: 41.75

## 2024-11-21 ASSESSMENT — REFRACTION_CURRENTRX
OS_OVR_VA: 20/
OD_VPRISM_DIRECTION: PROGS
OD_ADD: +2.75
OS_ADD: +2.75
OD_CYLINDER: +1.00
OD_AXIS: 014
OS_SPHERE: +2.00
OS_AXIS: 157
OS_VPRISM_DIRECTION: PROGS
OD_SPHERE: +3.25
OS_CYLINDER: +1.25
OD_OVR_VA: 20/

## 2024-11-21 ASSESSMENT — REFRACTION_AUTOREFRACTION
OS_SPHERE: +1.00
OS_CYLINDER: +1.75
OS_AXIS: 178
OD_SPHERE: +3.25
OD_AXIS: 003
OD_CYLINDER: +1.50

## 2024-11-21 ASSESSMENT — TONOMETRY
OS_IOP_MMHG: 20
OD_IOP_MMHG: 21

## 2024-11-21 ASSESSMENT — VISUAL ACUITY
OS_BCVA: 20/20
OD_BCVA: 20/80

## 2024-11-21 ASSESSMENT — LID EXAM ASSESSMENTS
OD_COMMENTS: BLEPHAROCHALASIS WITH HOODING OD&GT
OD_COMMENTS: OS
OS_COMMENTS: BLEPHAROCHALASIS WITH HOODING OD&GT
OS_COMMENTS: OS

## 2024-11-21 ASSESSMENT — CONFRONTATIONAL VISUAL FIELD TEST (CVF)
OD_FINDINGS: FULL
OS_FINDINGS: FULL